# Patient Record
Sex: MALE | Race: WHITE | NOT HISPANIC OR LATINO | Employment: FULL TIME | URBAN - METROPOLITAN AREA
[De-identification: names, ages, dates, MRNs, and addresses within clinical notes are randomized per-mention and may not be internally consistent; named-entity substitution may affect disease eponyms.]

---

## 2024-10-18 ENCOUNTER — APPOINTMENT (OUTPATIENT)
Dept: PRIMARY CARE | Facility: CLINIC | Age: 64
End: 2024-10-18
Payer: COMMERCIAL

## 2024-10-18 VITALS
HEIGHT: 65 IN | OXYGEN SATURATION: 98 % | TEMPERATURE: 97.6 F | WEIGHT: 158.6 LBS | DIASTOLIC BLOOD PRESSURE: 96 MMHG | SYSTOLIC BLOOD PRESSURE: 152 MMHG | HEART RATE: 56 BPM | BODY MASS INDEX: 26.42 KG/M2 | RESPIRATION RATE: 16 BRPM

## 2024-10-18 DIAGNOSIS — G89.29 CHRONIC HAND PAIN, LEFT: ICD-10-CM

## 2024-10-18 DIAGNOSIS — Z23 INFLUENZA VACCINE ADMINISTERED: ICD-10-CM

## 2024-10-18 DIAGNOSIS — E78.5 HYPERLIPIDEMIA, UNSPECIFIED HYPERLIPIDEMIA TYPE: ICD-10-CM

## 2024-10-18 DIAGNOSIS — M79.642 CHRONIC HAND PAIN, LEFT: ICD-10-CM

## 2024-10-18 DIAGNOSIS — I10 HYPERTENSION, UNCONTROLLED: Primary | ICD-10-CM

## 2024-10-18 DIAGNOSIS — Z00.00 HEALTHCARE MAINTENANCE: ICD-10-CM

## 2024-10-18 DIAGNOSIS — Z23 NEED FOR SHINGLES VACCINE: ICD-10-CM

## 2024-10-18 DIAGNOSIS — R94.31 ABNORMAL EKG: ICD-10-CM

## 2024-10-18 DIAGNOSIS — L57.0 ACTINIC KERATOSIS: ICD-10-CM

## 2024-10-18 DIAGNOSIS — Z12.5 SCREENING PSA (PROSTATE SPECIFIC ANTIGEN): ICD-10-CM

## 2024-10-18 DIAGNOSIS — R21 RASH: ICD-10-CM

## 2024-10-18 RX ORDER — LOSARTAN POTASSIUM 50 MG/1
50 TABLET ORAL DAILY
Qty: 60 TABLET | Refills: 0 | Status: SHIPPED | OUTPATIENT
Start: 2024-10-18

## 2024-10-18 RX ORDER — TRIAMCINOLONE ACETONIDE 1 MG/G
CREAM TOPICAL 2 TIMES DAILY
Qty: 30 G | Refills: 0 | Status: SHIPPED | OUTPATIENT
Start: 2024-10-18

## 2024-10-18 ASSESSMENT — ENCOUNTER SYMPTOMS
HEADACHES: 0
SHORTNESS OF BREATH: 0

## 2024-10-18 NOTE — PROGRESS NOTES
"Subjective     Jericho Dotson is a 64 y.o. male who presents for Rash and Trigger Finger (Possible ).    Rash  Pertinent negatives include no shortness of breath.        Pt is here to have left hand/finger pain and decreased ROM evaluated which has been present six months.      He also has hx of elevated blood pressure but has been hesitant to start antihypertensive medications in the past.  He is agreeable to starting medication for HTN.  Patient denies chest pain, shortness of breath, dizziness, headaches or vision changes.       No family hx of CAD or HTN.     Review of Systems   Respiratory:  Negative for shortness of breath.    Cardiovascular:  Negative for chest pain.   Skin:  Positive for rash.   Neurological:  Negative for headaches.       Objective     Vitals:    10/18/24 1005 10/18/24 1136   BP: (!) 168/106 (!) 152/96   BP Location: Left arm    Patient Position: Sitting    Pulse: 56    Resp: 16    Temp: 36.4 °C (97.6 °F)    SpO2: 98%    Weight: 71.9 kg (158 lb 9.6 oz)    Height: 1.651 m (5' 5\")         Current Outpatient Medications   Medication Instructions    losartan (COZAAR) 50 mg, oral, Daily    triamcinolone (Kenalog) 0.1 % cream Topical, 2 times daily        No Known Allergies     Past Surgical History:   Procedure Laterality Date    OTHER SURGICAL HISTORY  09/17/2021    Stomach surgery    OTHER SURGICAL HISTORY  09/17/2021    Tonsillectomy    OTHER SURGICAL HISTORY  06/15/2022    Hernia repair    OTHER SURGICAL HISTORY  06/15/2022    Appendectomy        Social History     Tobacco Use    Smoking status: Never    Smokeless tobacco: Never   Substance Use Topics    Drug use: Yes     Types: Marijuana        No family history on file.     Immunization History   Administered Date(s) Administered    Flu vaccine, trivalent, preservative free, age 6 months and greater (Fluarix/Fluzone/Flulaval) 10/18/2024    Pfizer COVID-19 vaccine, bivalent, age 12 years and older (30 mcg/0.3 mL) 10/22/2022    Pfizer " Purple Cap SARS-CoV-2 05/04/2021, 05/24/2021, 12/11/2021    Tdap vaccine, age 7 year and older (BOOSTRIX, ADACEL) 04/03/2015, 09/17/2021    Zoster vaccine, recombinant, adult (SHINGRIX) 09/17/2021        Physical Exam  Vitals reviewed.   Constitutional:       General: He is not in acute distress.     Appearance: Normal appearance. He is well-developed.   HENT:      Head: Normocephalic and atraumatic.   Eyes:      General: Lids are normal.      Conjunctiva/sclera:      Right eye: Right conjunctiva is not injected.      Left eye: Left conjunctiva is not injected.   Cardiovascular:      Rate and Rhythm: Normal rate and regular rhythm.      Heart sounds: No murmur heard.  Pulmonary:      Effort: Pulmonary effort is normal. No respiratory distress.      Breath sounds: Normal breath sounds. No wheezing, rhonchi or rales.   Skin:     General: Skin is warm and dry.      Findings: Lesion (right cheek of face, possible AK) present.   Neurological:      Mental Status: He is alert and oriented to person, place, and time. Mental status is at baseline.   Psychiatric:         Mood and Affect: Mood normal.         Behavior: Behavior normal.         Assessment & Plan  Hypertension, uncontrolled  Start losartan 50 mg daily, The goals of therapy, medication dose, frequency and potential side effects were discussed with patient today.  The patient is agreeable to taking the medication as prescribed.   Patient was instructed to record blood pressures (using an arm BP monitor) at home 1-2 times per day (per AHA guidelines) and to follow up in office for a blood pressure recheck in 4-6 weeks.  I also encouraged low-sodium diet and regular exercise.  I also discussed with patient the importance of good blood pressure control to avoid long-term complications such as heart attack and stroke.      Orders:    CT cardiac scoring wo IV contrast; Future    Comprehensive Metabolic Panel; Future    CBC and Auto Differential; Future    TSH with  reflex to Free T4 if abnormal; Future    losartan (Cozaar) 50 mg tablet; Take 1 tablet (50 mg) by mouth once daily.    ECG 12 Lead    Referral to Cardiology; Future    Abnormal EKG  Will have pt see cardiology.  Pt is asymptomatic.  I will also order CT calcium score.  Advised to go to ER if he develops any chest pain, shortness of breath, dizziness.    Orders:    Referral to Cardiology; Future    Healthcare maintenance    Orders:    CT cardiac scoring wo IV contrast; Future    Comprehensive Metabolic Panel; Future    Lipid Panel; Future    CBC and Auto Differential; Future    Hemoglobin A1C; Future    Screening PSA (prostate specific antigen)    Orders:    Prostate Specific Antigen; Future    Hyperlipidemia, unspecified hyperlipidemia type  Pt declined statin therapy at this time.   Orders:    CT cardiac scoring wo IV contrast; Future    Comprehensive Metabolic Panel; Future    Lipid Panel; Future    Referral to Cardiology; Future    Chronic hand pain, left    Orders:    Referral to Orthopaedic Surgery; Future    Influenza vaccine administered    Orders:    Flu vaccine, trivalent, preservative free, age 6 months and greater (Fluraix/Fluzone/Flulaval)    Rash    Orders:    triamcinolone (Kenalog) 0.1 % cream; Apply topically 2 times a day.    Actinic keratosis    Orders:    Referral to Dermatology

## 2024-10-21 ENCOUNTER — LAB (OUTPATIENT)
Dept: LAB | Facility: LAB | Age: 64
End: 2024-10-21
Payer: COMMERCIAL

## 2024-10-21 DIAGNOSIS — I10 HYPERTENSION, UNCONTROLLED: ICD-10-CM

## 2024-10-21 DIAGNOSIS — Z00.00 HEALTHCARE MAINTENANCE: ICD-10-CM

## 2024-10-21 DIAGNOSIS — E78.5 HYPERLIPIDEMIA, UNSPECIFIED HYPERLIPIDEMIA TYPE: ICD-10-CM

## 2024-10-21 DIAGNOSIS — Z12.5 SCREENING PSA (PROSTATE SPECIFIC ANTIGEN): ICD-10-CM

## 2024-10-21 LAB
ALBUMIN SERPL BCP-MCNC: 4.3 G/DL (ref 3.4–5)
ALP SERPL-CCNC: 63 U/L (ref 33–136)
ALT SERPL W P-5'-P-CCNC: 26 U/L (ref 10–52)
ANION GAP SERPL CALC-SCNC: 10 MMOL/L (ref 10–20)
AST SERPL W P-5'-P-CCNC: 24 U/L (ref 9–39)
BASOPHILS # BLD AUTO: 0.04 X10*3/UL (ref 0–0.1)
BASOPHILS NFR BLD AUTO: 0.9 %
BILIRUB SERPL-MCNC: 0.7 MG/DL (ref 0–1.2)
BUN SERPL-MCNC: 19 MG/DL (ref 6–23)
CALCIUM SERPL-MCNC: 9.3 MG/DL (ref 8.6–10.6)
CHLORIDE SERPL-SCNC: 104 MMOL/L (ref 98–107)
CHOLEST SERPL-MCNC: 223 MG/DL (ref 0–199)
CHOLESTEROL/HDL RATIO: 3.7
CO2 SERPL-SCNC: 32 MMOL/L (ref 21–32)
CREAT SERPL-MCNC: 1.1 MG/DL (ref 0.5–1.3)
EGFRCR SERPLBLD CKD-EPI 2021: 75 ML/MIN/1.73M*2
EOSINOPHIL # BLD AUTO: 0.13 X10*3/UL (ref 0–0.7)
EOSINOPHIL NFR BLD AUTO: 3 %
ERYTHROCYTE [DISTWIDTH] IN BLOOD BY AUTOMATED COUNT: 11.9 % (ref 11.5–14.5)
EST. AVERAGE GLUCOSE BLD GHB EST-MCNC: 108 MG/DL
GLUCOSE SERPL-MCNC: 95 MG/DL (ref 74–99)
HBA1C MFR BLD: 5.4 %
HCT VFR BLD AUTO: 47 % (ref 41–52)
HDLC SERPL-MCNC: 59.9 MG/DL
HGB BLD-MCNC: 15.7 G/DL (ref 13.5–17.5)
IMM GRANULOCYTES # BLD AUTO: 0.01 X10*3/UL (ref 0–0.7)
IMM GRANULOCYTES NFR BLD AUTO: 0.2 % (ref 0–0.9)
LDLC SERPL CALC-MCNC: 147 MG/DL
LYMPHOCYTES # BLD AUTO: 1.17 X10*3/UL (ref 1.2–4.8)
LYMPHOCYTES NFR BLD AUTO: 27.4 %
MCH RBC QN AUTO: 29.3 PG (ref 26–34)
MCHC RBC AUTO-ENTMCNC: 33.4 G/DL (ref 32–36)
MCV RBC AUTO: 88 FL (ref 80–100)
MONOCYTES # BLD AUTO: 0.65 X10*3/UL (ref 0.1–1)
MONOCYTES NFR BLD AUTO: 15.2 %
NEUTROPHILS # BLD AUTO: 2.27 X10*3/UL (ref 1.2–7.7)
NEUTROPHILS NFR BLD AUTO: 53.3 %
NON HDL CHOLESTEROL: 163 MG/DL (ref 0–149)
NRBC BLD-RTO: 0 /100 WBCS (ref 0–0)
PLATELET # BLD AUTO: 196 X10*3/UL (ref 150–450)
POTASSIUM SERPL-SCNC: 4.3 MMOL/L (ref 3.5–5.3)
PROT SERPL-MCNC: 6.6 G/DL (ref 6.4–8.2)
PSA SERPL-MCNC: 3.17 NG/ML
RBC # BLD AUTO: 5.35 X10*6/UL (ref 4.5–5.9)
SODIUM SERPL-SCNC: 142 MMOL/L (ref 136–145)
TRIGL SERPL-MCNC: 79 MG/DL (ref 0–149)
TSH SERPL-ACNC: 2.52 MIU/L (ref 0.44–3.98)
VLDL: 16 MG/DL (ref 0–40)
WBC # BLD AUTO: 4.3 X10*3/UL (ref 4.4–11.3)

## 2024-10-21 PROCEDURE — 84153 ASSAY OF PSA TOTAL: CPT

## 2024-10-21 PROCEDURE — 36415 COLL VENOUS BLD VENIPUNCTURE: CPT

## 2024-10-21 PROCEDURE — 84443 ASSAY THYROID STIM HORMONE: CPT

## 2024-10-21 PROCEDURE — 80061 LIPID PANEL: CPT

## 2024-10-21 PROCEDURE — 85025 COMPLETE CBC W/AUTO DIFF WBC: CPT

## 2024-10-21 PROCEDURE — 83036 HEMOGLOBIN GLYCOSYLATED A1C: CPT

## 2024-10-21 PROCEDURE — 80053 COMPREHEN METABOLIC PANEL: CPT

## 2024-10-30 ENCOUNTER — OFFICE VISIT (OUTPATIENT)
Dept: ORTHOPEDIC SURGERY | Facility: CLINIC | Age: 64
End: 2024-10-30
Payer: COMMERCIAL

## 2024-10-30 ENCOUNTER — HOSPITAL ENCOUNTER (OUTPATIENT)
Dept: RADIOLOGY | Facility: CLINIC | Age: 64
Discharge: HOME | End: 2024-10-30
Payer: COMMERCIAL

## 2024-10-30 DIAGNOSIS — M79.642 PAIN OF LEFT HAND: ICD-10-CM

## 2024-10-30 DIAGNOSIS — M79.642 CHRONIC HAND PAIN, LEFT: ICD-10-CM

## 2024-10-30 DIAGNOSIS — M19.042 ARTHRITIS OF HAND, LEFT: ICD-10-CM

## 2024-10-30 DIAGNOSIS — G89.29 CHRONIC HAND PAIN, LEFT: ICD-10-CM

## 2024-10-30 PROCEDURE — 99214 OFFICE O/P EST MOD 30 MIN: CPT | Mod: GC | Performed by: ORTHOPAEDIC SURGERY

## 2024-10-30 PROCEDURE — 73130 X-RAY EXAM OF HAND: CPT | Mod: LT

## 2024-10-30 PROCEDURE — 1036F TOBACCO NON-USER: CPT | Performed by: ORTHOPAEDIC SURGERY

## 2024-10-30 PROCEDURE — 99204 OFFICE O/P NEW MOD 45 MIN: CPT | Performed by: ORTHOPAEDIC SURGERY

## 2024-11-11 ENCOUNTER — APPOINTMENT (OUTPATIENT)
Dept: PRIMARY CARE | Facility: CLINIC | Age: 64
End: 2024-11-11
Payer: COMMERCIAL

## 2024-11-11 ENCOUNTER — APPOINTMENT (OUTPATIENT)
Dept: CARDIOLOGY | Facility: CLINIC | Age: 64
End: 2024-11-11
Payer: COMMERCIAL

## 2024-11-11 VITALS
SYSTOLIC BLOOD PRESSURE: 146 MMHG | HEIGHT: 65 IN | BODY MASS INDEX: 26.16 KG/M2 | DIASTOLIC BLOOD PRESSURE: 88 MMHG | WEIGHT: 157 LBS | HEART RATE: 56 BPM

## 2024-11-11 DIAGNOSIS — R94.31 ABNORMAL EKG: ICD-10-CM

## 2024-11-11 DIAGNOSIS — I10 HYPERTENSION, UNCONTROLLED: ICD-10-CM

## 2024-11-11 DIAGNOSIS — E78.5 HYPERLIPIDEMIA, UNSPECIFIED HYPERLIPIDEMIA TYPE: ICD-10-CM

## 2024-11-11 DIAGNOSIS — R07.89 ATYPICAL CHEST PAIN: Primary | ICD-10-CM

## 2024-11-11 PROCEDURE — 3079F DIAST BP 80-89 MM HG: CPT | Performed by: INTERNAL MEDICINE

## 2024-11-11 PROCEDURE — 1036F TOBACCO NON-USER: CPT | Performed by: INTERNAL MEDICINE

## 2024-11-11 PROCEDURE — 3077F SYST BP >= 140 MM HG: CPT | Performed by: INTERNAL MEDICINE

## 2024-11-11 PROCEDURE — 3008F BODY MASS INDEX DOCD: CPT | Performed by: INTERNAL MEDICINE

## 2024-11-11 PROCEDURE — 99204 OFFICE O/P NEW MOD 45 MIN: CPT | Performed by: INTERNAL MEDICINE

## 2024-11-11 PROCEDURE — 93000 ELECTROCARDIOGRAM COMPLETE: CPT | Performed by: INTERNAL MEDICINE

## 2024-11-11 RX ORDER — TADALAFIL 5 MG/1
5 TABLET ORAL DAILY PRN
COMMUNITY

## 2024-11-11 NOTE — ASSESSMENT & PLAN NOTE
Risk factor modification: educational materials were provided to the patient.     At his next appointment, in the context of his upcoming coronary artery calcium score, we will talk about pharmacotherapy for his hyperlipidemia.  Orders:    Referral to Cardiology    Follow Up In Cardiology; Future

## 2024-11-11 NOTE — PATIENT INSTRUCTIONS
"It was my pleasure to meet you.  I look forward to being your cardiologist.  I am a huge believer in communicating with my patients.  Please contact me at any time, if anything is not clear to you regarding anything we have discussed, or if new questions occur to you.     You should increase your intake of fresh fruits and vegetables.  Try to consume 9-12 servings per day of such foods.  You should increase your intake of deep sea fish such as salmon and tuna.  Try to get two servings per week of fish, but if you are a pregnant woman, talk to your obstetrician before increasing your fish intake.  You should increase your intake of unprocessed nuts such as walnuts or almonds.  Increase your intake of plant-based protein.  You should avoid fried foods.  Don't consume sugary or starchy foods and sugary drinks.  Avoid saturated fats.  Try not to dine at restaurants more than once per month, and don't dine at fast food places.  Try to get 7-9 hours of sleep every night.  Try to get 150 minutes per week of moderate intensity exercise (after I have cleared you to start an exercise program).  Try to maintain the appropriate weight for your height based on body mass index (BMI). Maintain your cholesterol, blood sugar, and blood pressure in the recommended respective normal ranges.  There is a wealth of information on the American Heart Association's website regarding this.  Just Google \"Life's Essential 8\" for more information.   Ask me about any of these details  if you have questions.    As your cardiologist, I will be available to you at any time to answer any question you have concerning your heart health.  My staff, Suzie can also answer any questions you may have.  Best of luck.       It is important for us to have an accurate list of the medications, supplements, and their doses.  It is also important for us to have an accurate list of your allergies.  Please bring this information to every appointment.  " This is a vital part of the quality of care you receive through all of your providers.

## 2024-11-11 NOTE — PROGRESS NOTES
"Referred by Dr. Prather for Please see below.     History Of Present Illness:    Jericho Dotson is a 64 y.o. male presenting with hypertension, hyperlipidemia, abnormal EKG.    I am seeing this 64-year-old hypertensive, hyperlipidemic man at the request of Dr. Prather due to hypertension, hyperlipidemia, and an abnormal EKG.    The patient has had hypertension since he was in his thirties.  His PCP prescribed Losartan, but he has not yeat started the medication.  He prides himself on the fact that he is 64 years old and is not taking any medications and for this reason has resisted starting the prescription.  The patient reports from time to time he [id number] post prandial chest discomfort.  He refers to it as \"gas\".  The discomfort is located in his left parasternal region (pointing), and the symptoms seem to occur after meals.  He burps and the chest discomfort resolves.  The symptoms are actually relieved with exertion.  He works as an , and his work involves a great deal of physical exertion.  The patient denies dyspnea, palpitations, orthopnea, PND, syncope, and near syncope.    At his primary care physician's office, recent EKG was done which I have reviewed.  This discloses early repolarization.  His primary care physician ordered a coronary artery calcium score which is scheduled to be done on January 25, 2025.      Past Medical History:  He has no past medical history on file.    Past Surgical History:  He has a past surgical history that includes Other surgical history (09/17/2021); Other surgical history (09/17/2021); Other surgical history (06/15/2022); and Other surgical history (06/15/2022).      Social History:  He reports that he has never smoked. He has never used smokeless tobacco. He reports that he does not currently use alcohol. He reports current drug use. Drug: Marijuana.    Family History:  Family History   Problem Relation Name Age of Onset    Diabetes Father      " "Breast cancer Paternal Grandmother          Allergies:  Patient has no known allergies.    Outpatient Medications:  Current Outpatient Medications   Medication Instructions    losartan (COZAAR) 50 mg, oral, Daily    tadalafil (CIALIS) 5 mg, Daily PRN    triamcinolone (Kenalog) 0.1 % cream Topical, 2 times daily        Last Recorded Vitals:  Vitals:    11/11/24 0700   BP: 146/88   BP Location: Right arm   Patient Position: Sitting   Pulse: 56   Weight: 71.2 kg (157 lb)   Height: 1.651 m (5' 5\")       Physical Exam:  GENERAL:  pleasant 64 year-old  HEENT: No xanthelasma  NECK: Supple, no palpable adenopathy or thyromegaly  CHEST: Clear to auscultation, respiratory effort unlabored  CARDIAC: RRR, normal S1 and S2, no audible murmur, rub, gallop, carotids are brisk, PMI is not displaced  ABD: Active bowel sounds, nontender, no organomegaly, no evidence of ascites  EXT: No clubbing, cyanosis, edema, or tenderness  NEURO: Awake, alert, appropriate, speech is fluent         Last Labs:  CBC -  Lab Results   Component Value Date    WBC 4.3 (L) 10/21/2024    HGB 15.7 10/21/2024    HCT 47.0 10/21/2024    MCV 88 10/21/2024     10/21/2024       CMP -  Lab Results   Component Value Date    CALCIUM 9.3 10/21/2024    PROT 6.6 10/21/2024    ALBUMIN 4.3 10/21/2024    AST 24 10/21/2024    ALT 26 10/21/2024    ALKPHOS 63 10/21/2024    BILITOT 0.7 10/21/2024       LIPID PANEL -   Lab Results   Component Value Date    CHOL 223 (H) 10/21/2024    TRIG 79 10/21/2024    HDL 59.9 10/21/2024    CHHDL 3.7 10/21/2024    LDLF 147 (H) 06/16/2022    VLDL 16 10/21/2024    NHDL 163 (H) 10/21/2024       RENAL FUNCTION PANEL -   Lab Results   Component Value Date    GLUCOSE 95 10/21/2024     10/21/2024    K 4.3 10/21/2024     10/21/2024    CO2 32 10/21/2024    ANIONGAP 10 10/21/2024    BUN 19 10/21/2024    CREATININE 1.10 10/21/2024    GFRMALE 89 06/16/2022    CALCIUM 9.3 10/21/2024    ALBUMIN 4.3 10/21/2024        Lab Results "   Component Value Date    HGBA1C 5.4 10/21/2024         Lab review: I have Chemistry CMP:   Lab Results   Component Value Date    ALBUMIN 4.3 10/21/2024    CALCIUM 9.3 10/21/2024    CO2 32 10/21/2024    CREATININE 1.10 10/21/2024    GLUCOSE 95 10/21/2024    BILITOT 0.7 10/21/2024    PROT 6.6 10/21/2024    ALT 26 10/21/2024    AST 24 10/21/2024    ALKPHOS 63 10/21/2024   , Chemistry BMP   Lab Results   Component Value Date    GLUCOSE 95 10/21/2024    CALCIUM 9.3 10/21/2024    CO2 32 10/21/2024    CREATININE 1.10 10/21/2024   , CBC:  Lab Results   Component Value Date    WBC 4.3 (L) 10/21/2024    RBC 5.35 10/21/2024    HGB 15.7 10/21/2024    HCT 47.0 10/21/2024    MCV 88 10/21/2024    MCH 29.3 10/21/2024    MCHC 33.4 10/21/2024    RDW 11.9 10/21/2024    NRBC 0.0 10/21/2024   , and Lipids:   Lab Results   Component Value Date    CHOL 223 (H) 10/21/2024    HDL 59.9 10/21/2024    LDLCALC 147 (H) 10/21/2024    TRIG 79 10/21/2024     Diagnostic review: I have independently interpreted the EKG .  My findings are the EKG done on October 18, 2024 independently interpreted by me discloses sinus rhythm with early repolarization.    Assessment/Plan   Assessment & Plan  Hypertension, uncontrolled  HTN: BP is not well controlled.   We discussed sodium restriction, lifestyle modification, and the DASH diet.  I advised the patient to log blood pressures daily, and to bring the data to the next appointment.  If home BPs are also high, will need to add, or adjust medications.    I advised him to start taking the losartan as prescribed by his primary care physician.  I explained to him that untreated hypertension can lead to myocardial infarction, stroke, heart failure, and renal failure.  He understands and agrees.    Risk factor modification: educational materials were provided to the patient.     Orders:    Referral to Cardiology    Follow Up In Cardiology; Future    Hyperlipidemia, unspecified hyperlipidemia type  Risk factor  modification: educational materials were provided to the patient.     At his next appointment, in the context of his upcoming coronary artery calcium score, we will talk about pharmacotherapy for his hyperlipidemia.  Orders:    Referral to Cardiology    Follow Up In Cardiology; Future    Abnormal EKG    Orders:    Referral to Cardiology    ECG 12 lead (Clinic Performed)    Follow Up In Cardiology; Future    Atypical chest pain    Orders:    Stress Test; Future    Follow Up In Cardiology; Future          Ubaldo Bedoya MD

## 2024-11-11 NOTE — ASSESSMENT & PLAN NOTE
HTN: BP is not well controlled.   We discussed sodium restriction, lifestyle modification, and the DASH diet.  I advised the patient to log blood pressures daily, and to bring the data to the next appointment.  If home BPs are also high, will need to add, or adjust medications.    I advised him to start taking the losartan as prescribed by his primary care physician.  I explained to him that untreated hypertension can lead to myocardial infarction, stroke, heart failure, and renal failure.  He understands and agrees.    Risk factor modification: educational materials were provided to the patient.     Orders:    Referral to Cardiology    Follow Up In Cardiology; Future

## 2024-11-13 PROBLEM — M19.042 ARTHRITIS OF HAND, LEFT: Status: ACTIVE | Noted: 2024-11-13

## 2025-01-05 DIAGNOSIS — I10 HYPERTENSION, UNCONTROLLED: ICD-10-CM

## 2025-01-05 RX ORDER — LOSARTAN POTASSIUM 50 MG/1
50 TABLET ORAL DAILY
Qty: 30 TABLET | Refills: 0 | Status: SHIPPED | OUTPATIENT
Start: 2025-01-05

## 2025-01-06 ENCOUNTER — APPOINTMENT (OUTPATIENT)
Dept: UROLOGY | Facility: CLINIC | Age: 65
End: 2025-01-06
Payer: COMMERCIAL

## 2025-01-06 VITALS
BODY MASS INDEX: 28.12 KG/M2 | HEART RATE: 61 BPM | SYSTOLIC BLOOD PRESSURE: 162 MMHG | TEMPERATURE: 98.2 F | WEIGHT: 169 LBS | DIASTOLIC BLOOD PRESSURE: 92 MMHG

## 2025-01-06 DIAGNOSIS — N40.1 BPH WITH LOWER URINARY TRACT SYMPTOMS WITHOUT URINARY OBSTRUCTION: ICD-10-CM

## 2025-01-06 DIAGNOSIS — N52.9 VASCULOGENIC ERECTILE DYSFUNCTION, UNSPECIFIED VASCULOGENIC ERECTILE DYSFUNCTION TYPE: Primary | ICD-10-CM

## 2025-01-06 PROCEDURE — 99214 OFFICE O/P EST MOD 30 MIN: CPT | Performed by: UROLOGY

## 2025-01-06 PROCEDURE — 3077F SYST BP >= 140 MM HG: CPT | Performed by: UROLOGY

## 2025-01-06 PROCEDURE — 1036F TOBACCO NON-USER: CPT | Performed by: UROLOGY

## 2025-01-06 PROCEDURE — G2211 COMPLEX E/M VISIT ADD ON: HCPCS | Performed by: UROLOGY

## 2025-01-06 PROCEDURE — 3080F DIAST BP >= 90 MM HG: CPT | Performed by: UROLOGY

## 2025-01-06 RX ORDER — TADALAFIL 5 MG/1
5 TABLET ORAL DAILY
Qty: 90 TABLET | Refills: 3 | Status: SHIPPED | OUTPATIENT
Start: 2025-01-06 | End: 2026-01-06

## 2025-01-06 NOTE — PROGRESS NOTES
PRIOR NOTES  64-year-old male here to see me regarding urinary symptoms, erectile dysfunction  Kindly referred by Dr. 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  -Prostate 4.2 x 3.3 x 4.0  -Prostate volume approximately 30 g  IPSS 5 QOL 1  ED is mild, responds well to tadalafil, does not always need it  Exam: Modest enlargement, benign gland     LUTS  - 08/2022 - observation  - 01/2025 - IPSS 17    PSA  - 2022 - 2.64, PSAD 0.08  - 10/2024 - 3.17     ED  - tadalafil  5mg  - 1/2025 - switched to a med he gets from an [employer] market, unsure of what it is, works well with this drug    UPDATED SUBJECTIVE HISTORY  01/06/25 -     LUTS - weak flow, frequency, incomplete emptying, double voiding (in am)  Denies: hesitancy, urgency, straining, intermittency, incontinence, hematuria  NTF: 1x  QOL: 2-3    Past Medical History  He has no past medical history on file.    Surgical History  He has a past surgical history that includes Other surgical history (09/17/2021); Other surgical history (09/17/2021); Other surgical history (06/15/2022); and Other surgical history (06/15/2022).     Social History  He reports that he has never smoked. He has never used smokeless tobacco. He reports that he does not currently use alcohol. He reports current drug use. Drug: Marijuana.    Family History  Family History   Problem Relation Name Age of Onset    Diabetes Father      Breast cancer Paternal Grandmother          Allergies  Patient has no known allergies.    ROS: 12 system review was completed and is negative with the exception of those signs and symptoms noted in the history of present illness: A 12  system review was completed and is negative with the exception of those signs and symptoms noted in the history of present illness.     Exam:  General: in NAD, appears stated age  Head: normocephalic, atraumatic  Respiratory: normal effort, no use of accessory muscles  Cardiovascular: no edema noted  Skin: normal turgor, no rashes  Neurologic: grossly intact, oriented to person/place/time  Psychiatric: mode and affect appropriate     Last Recorded Vitals  Blood pressure (!) 162/92, pulse 61, temperature 36.8 °C (98.2 °F), temperature source Temporal, weight 76.7 kg (169 lb).    Lab Results   Component Value Date    CREATININE 1.10 10/21/2024    HGB 15.7 10/21/2024         ASSESSMENT/PLAN:  # Lower urinary tract symptoms  -Patient has a weak stream, mild urgency  -IPSS 17  -We discussed tamsulosin, tadalafil, and surgical management  -Patient is averse to pills but is willing to try tadalafil 5 mg daily to treat this and his erectile dysfunction  -Reassess in 6 months    # Erectile dysfunction  -I recommend he stop his over-the-counter that he is unaware of the ingredients for  -Recommend he restart tadalafil 5 mg daily    Follow-up 6 months    Abhilash Tellez MD

## 2025-01-07 NOTE — TELEPHONE ENCOUNTER
Per pt, our office is too far for him now, he is considering switching doctors. He will call back if he decides to stay with us.

## 2025-01-25 ENCOUNTER — HOSPITAL ENCOUNTER (OUTPATIENT)
Dept: RADIOLOGY | Facility: HOSPITAL | Age: 65
Discharge: HOME | End: 2025-01-25
Payer: COMMERCIAL

## 2025-01-25 DIAGNOSIS — E78.5 HYPERLIPIDEMIA, UNSPECIFIED HYPERLIPIDEMIA TYPE: ICD-10-CM

## 2025-01-25 DIAGNOSIS — Z00.00 HEALTHCARE MAINTENANCE: ICD-10-CM

## 2025-01-25 DIAGNOSIS — I10 HYPERTENSION, UNCONTROLLED: ICD-10-CM

## 2025-01-25 PROCEDURE — 75571 CT HRT W/O DYE W/CA TEST: CPT

## 2025-01-27 ENCOUNTER — TELEPHONE (OUTPATIENT)
Facility: CLINIC | Age: 65
End: 2025-01-27
Payer: COMMERCIAL

## 2025-01-30 DIAGNOSIS — I10 HYPERTENSION, UNCONTROLLED: ICD-10-CM

## 2025-01-30 RX ORDER — LOSARTAN POTASSIUM 50 MG/1
50 TABLET ORAL DAILY
Qty: 90 TABLET | Refills: 0 | Status: SHIPPED | OUTPATIENT
Start: 2025-01-30

## 2025-02-17 ENCOUNTER — APPOINTMENT (OUTPATIENT)
Dept: CARDIOLOGY | Facility: CLINIC | Age: 65
End: 2025-02-17
Payer: COMMERCIAL

## 2025-02-24 ENCOUNTER — APPOINTMENT (OUTPATIENT)
Dept: CARDIOLOGY | Facility: CLINIC | Age: 65
End: 2025-02-24
Payer: COMMERCIAL

## 2025-02-24 VITALS
DIASTOLIC BLOOD PRESSURE: 80 MMHG | HEIGHT: 65 IN | SYSTOLIC BLOOD PRESSURE: 140 MMHG | HEART RATE: 60 BPM | BODY MASS INDEX: 26.82 KG/M2 | WEIGHT: 161 LBS

## 2025-02-24 DIAGNOSIS — I77.810 ECTATIC THORACIC AORTA (CMS-HCC): Primary | ICD-10-CM

## 2025-02-24 DIAGNOSIS — R94.31 ABNORMAL EKG: ICD-10-CM

## 2025-02-24 DIAGNOSIS — E78.5 HYPERLIPIDEMIA, UNSPECIFIED HYPERLIPIDEMIA TYPE: ICD-10-CM

## 2025-02-24 DIAGNOSIS — I10 HYPERTENSION, UNCONTROLLED: ICD-10-CM

## 2025-02-24 DIAGNOSIS — R07.89 ATYPICAL CHEST PAIN: ICD-10-CM

## 2025-02-24 PROCEDURE — 3079F DIAST BP 80-89 MM HG: CPT | Performed by: INTERNAL MEDICINE

## 2025-02-24 PROCEDURE — 3008F BODY MASS INDEX DOCD: CPT | Performed by: INTERNAL MEDICINE

## 2025-02-24 PROCEDURE — 3077F SYST BP >= 140 MM HG: CPT | Performed by: INTERNAL MEDICINE

## 2025-02-24 PROCEDURE — 99214 OFFICE O/P EST MOD 30 MIN: CPT | Performed by: INTERNAL MEDICINE

## 2025-02-24 PROCEDURE — 1036F TOBACCO NON-USER: CPT | Performed by: INTERNAL MEDICINE

## 2025-02-24 RX ORDER — LOSARTAN POTASSIUM 100 MG/1
100 TABLET ORAL ONCE
Status: DISCONTINUED | OUTPATIENT
Start: 2025-02-24 | End: 2025-02-28

## 2025-02-24 NOTE — PROGRESS NOTES
"Chief Complaint:   Please see below.     History Of Present Illness:    Jericho Dotson is a 64 y.o. male presenting with hypertension, hyperlipidemia, abnormal EKG .    At the previous visit, he describes episodic chest discomfort.  Please see my prior note for details.  I ordered a stress test, and his PCP ordered a coronary artery calcium score.  He did not follow through with the stress test.   The patient's coronary artery calcium score on 1/25/2025 was 0.  His ascending aorta measured 4.0 cm in diameter.  The patient's projected 10 year event rate according to MISHRA with calcium score is 3%.     He continues to experience episodic chest discomfort that feels like gas, twice a week.  He burps, and the symptoms go away.  The chest discomfort is not exertional.  The symptoms are similar to his description in my previous note.  The patient denies dyspnea, palpitations, orthopnea, PND, syncope, and near syncope.         Last Recorded Vitals:  Vitals:    02/24/25 0900   BP: 140/80   BP Location: Left arm   Patient Position: Sitting   Pulse: 60   Weight: 73 kg (161 lb)   Height: 1.651 m (5' 5\")       Past Medical History:  He has no past medical history on file.    Past Surgical History:  He has a past surgical history that includes Other surgical history (09/17/2021); Other surgical history (09/17/2021); Other surgical history (06/15/2022); and Other surgical history (06/15/2022).      Social History:  He reports that he has never smoked. He has never used smokeless tobacco. He reports that he does not currently use alcohol. He reports that he does not currently use drugs after having used the following drugs: Marijuana.    Family History:  Family History   Problem Relation Name Age of Onset    Diabetes Father      Breast cancer Paternal Grandmother          Allergies:  Patient has no known allergies.    Outpatient Medications:  Current Outpatient Medications   Medication Instructions    tadalafil (CIALIS) 5 mg, " oral, Daily    triamcinolone (Kenalog) 0.1 % cream Topical, 2 times daily       Physical Exam:  GENERAL:  pleasant 64 year-old  HEENT: No xanthelasma  NECK: Supple, no palpable adenopathy or thyromegaly  CHEST: Clear to auscultation, respiratory effort unlabored  CARDIAC: RRR, normal S1 and S2, no audible murmur, rub, gallop, carotids are brisk, PMI is not displaced  ABD: Active bowel sounds, nontender, no organomegaly, no evidence of ascites  EXT: No clubbing, cyanosis, edema, or tenderness  NEURO: Awake, alert, appropriate, speech is fluent       Last Labs:  CBC -  Lab Results   Component Value Date    WBC 4.3 (L) 10/21/2024    HGB 15.7 10/21/2024    HCT 47.0 10/21/2024    MCV 88 10/21/2024     10/21/2024       CMP -  Lab Results   Component Value Date    CALCIUM 9.3 10/21/2024    PROT 6.6 10/21/2024    ALBUMIN 4.3 10/21/2024    AST 24 10/21/2024    ALT 26 10/21/2024    ALKPHOS 63 10/21/2024    BILITOT 0.7 10/21/2024       LIPID PANEL -   Lab Results   Component Value Date    CHOL 223 (H) 10/21/2024    TRIG 79 10/21/2024    HDL 59.9 10/21/2024    CHHDL 3.7 10/21/2024    LDLF 147 (H) 06/16/2022    VLDL 16 10/21/2024    NHDL 163 (H) 10/21/2024       RENAL FUNCTION PANEL -   Lab Results   Component Value Date    GLUCOSE 95 10/21/2024     10/21/2024    K 4.3 10/21/2024     10/21/2024    CO2 32 10/21/2024    ANIONGAP 10 10/21/2024    BUN 19 10/21/2024    CREATININE 1.10 10/21/2024    GFRMALE 89 06/16/2022    CALCIUM 9.3 10/21/2024    ALBUMIN 4.3 10/21/2024        Lab Results   Component Value Date    HGBA1C 5.4 10/21/2024         Lab review: I have Chemistry CMP:   Lab Results   Component Value Date    ALBUMIN 4.3 10/21/2024    CALCIUM 9.3 10/21/2024    CO2 32 10/21/2024    CREATININE 1.10 10/21/2024    GLUCOSE 95 10/21/2024    BILITOT 0.7 10/21/2024    PROT 6.6 10/21/2024    ALT 26 10/21/2024    AST 24 10/21/2024    ALKPHOS 63 10/21/2024   , Chemistry BMP   Lab Results   Component Value Date     GLUCOSE 95 10/21/2024    CALCIUM 9.3 10/21/2024    CO2 32 10/21/2024    CREATININE 1.10 10/21/2024   , CBC:  Lab Results   Component Value Date    WBC 4.3 (L) 10/21/2024    RBC 5.35 10/21/2024    HGB 15.7 10/21/2024    HCT 47.0 10/21/2024    MCV 88 10/21/2024    MCH 29.3 10/21/2024    MCHC 33.4 10/21/2024    RDW 11.9 10/21/2024    NRBC 0.0 10/21/2024   , and Lipids:   Lab Results   Component Value Date    CHOL 223 (H) 10/21/2024    HDL 59.9 10/21/2024    LDLCALC 147 (H) 10/21/2024    TRIG 79 10/21/2024     Diagnostic review: I have personally reviewed the result(s) of the coronary artery calcium score. The patient's projected 10 year event rate according to MISHRA with calcium score is 3%.      Assessment/Plan   Assessment & Plan  Hypertension, uncontrolled  Risk factor modification: educational materials were provided to the patient.     Orders:    Follow Up In Cardiology    losartan (Cozaar) tablet 100 mg    Follow Up In Cardiology; Future    Hyperlipidemia, unspecified hyperlipidemia type  Risk factor modification: educational materials were provided to the patient.     Orders:    Follow Up In Cardiology    Follow Up In Cardiology; Future    Abnormal EKG    Orders:    Follow Up In Cardiology    Atypical chest pain  Treadmill stress test as ordered at the previous visit, with follow-up thereafter.  Orders:    Follow Up In Cardiology    Follow Up In Cardiology; Future    Ectatic thoracic aorta (CMS-HCC)               Ubaldo Bedoya MD

## 2025-02-24 NOTE — ASSESSMENT & PLAN NOTE
Risk factor modification: educational materials were provided to the patient.     Orders:    Follow Up In Cardiology    losartan (Cozaar) tablet 100 mg    Follow Up In Cardiology; Future

## 2025-02-24 NOTE — ASSESSMENT & PLAN NOTE
Treadmill stress test as ordered at the previous visit, with follow-up thereafter.  Orders:    Follow Up In Cardiology    Follow Up In Cardiology; Future

## 2025-02-24 NOTE — PATIENT INSTRUCTIONS

## 2025-02-28 ENCOUNTER — APPOINTMENT (OUTPATIENT)
Facility: CLINIC | Age: 65
End: 2025-02-28
Payer: COMMERCIAL

## 2025-02-28 VITALS
DIASTOLIC BLOOD PRESSURE: 86 MMHG | WEIGHT: 157 LBS | BODY MASS INDEX: 26.13 KG/M2 | HEART RATE: 52 BPM | TEMPERATURE: 98.4 F | SYSTOLIC BLOOD PRESSURE: 156 MMHG | RESPIRATION RATE: 18 BRPM | OXYGEN SATURATION: 98 %

## 2025-02-28 DIAGNOSIS — I10 HYPERTENSION, UNCONTROLLED: Primary | ICD-10-CM

## 2025-02-28 DIAGNOSIS — I77.810 ECTATIC THORACIC AORTA (CMS-HCC): ICD-10-CM

## 2025-02-28 DIAGNOSIS — E78.5 HYPERLIPIDEMIA, UNSPECIFIED HYPERLIPIDEMIA TYPE: ICD-10-CM

## 2025-02-28 DIAGNOSIS — R73.9 HYPERGLYCEMIA: ICD-10-CM

## 2025-02-28 PROCEDURE — 3079F DIAST BP 80-89 MM HG: CPT | Performed by: FAMILY MEDICINE

## 2025-02-28 PROCEDURE — 1036F TOBACCO NON-USER: CPT | Performed by: FAMILY MEDICINE

## 2025-02-28 PROCEDURE — 3077F SYST BP >= 140 MM HG: CPT | Performed by: FAMILY MEDICINE

## 2025-02-28 PROCEDURE — 99214 OFFICE O/P EST MOD 30 MIN: CPT | Performed by: FAMILY MEDICINE

## 2025-02-28 RX ORDER — LOSARTAN POTASSIUM 100 MG/1
100 TABLET ORAL DAILY
COMMUNITY
Start: 2025-02-28

## 2025-02-28 ASSESSMENT — PATIENT HEALTH QUESTIONNAIRE - PHQ9
SUM OF ALL RESPONSES TO PHQ9 QUESTIONS 1 AND 2: 0
2. FEELING DOWN, DEPRESSED OR HOPELESS: NOT AT ALL
1. LITTLE INTEREST OR PLEASURE IN DOING THINGS: NOT AT ALL

## 2025-02-28 ASSESSMENT — ENCOUNTER SYMPTOMS
HYPERTENSION: 1
SHORTNESS OF BREATH: 0
HEADACHES: 0

## 2025-02-28 NOTE — PROGRESS NOTES
"Subjective     Jericho Dotson is a 64 y.o. male who presents for Hypertension.    Hypertension  Associated symptoms include anxiety. Pertinent negatives include no chest pain, headaches or shortness of breath.      Pt was seen by  cardiology, 2/24, for HTN, HLD, EKG abnormal .  He was supposed to get a stress test due to chest pain but has not completed due to increased work schedule.  Pt's losartan was increased to 100 mg by his cardiologist.  Pt is not checking his blood pressures.      Pt had a recent CT calcium score in Jan 2025, score was 0 but it was noted he had mid/lower ascending thoracic aorta measures 4.0 cm in diameter.      He has HLD, not on statin.      Pt denies any current chest pain.   He does sometimes gets \"gas\" that causes chest discomfort.  No shortness of breath.      He is eating better.      Review of Systems   Respiratory:  Negative for shortness of breath.    Cardiovascular:  Negative for chest pain.   Neurological:  Negative for headaches.       Objective     Vitals:    02/28/25 0859   BP: 156/86   BP Location: Left arm   Patient Position: Sitting   Pulse: 52   Resp: 18   Temp: 36.9 °C (98.4 °F)   TempSrc: Temporal   SpO2: 98%   Weight: 71.2 kg (157 lb)        Current Outpatient Medications   Medication Instructions    losartan (COZAAR) 100 mg, oral, Daily    tadalafil (CIALIS) 5 mg, oral, Daily    triamcinolone (Kenalog) 0.1 % cream Topical, 2 times daily        No Known Allergies     Past Surgical History:   Procedure Laterality Date    OTHER SURGICAL HISTORY  09/17/2021    Stomach surgery    OTHER SURGICAL HISTORY  09/17/2021    Tonsillectomy    OTHER SURGICAL HISTORY  06/15/2022    Hernia repair    OTHER SURGICAL HISTORY  06/15/2022    Appendectomy        Social History     Tobacco Use    Smoking status: Never    Smokeless tobacco: Never   Vaping Use    Vaping status: Never Used   Substance Use Topics    Alcohol use: Not Currently     Comment: stopped 2023.    Drug use: Not " Currently     Types: Marijuana     Comment: Hx:  THC Gummies. (Quit 2025).        Family History   Problem Relation Name Age of Onset    Diabetes Father      Breast cancer Paternal Grandmother          Immunization History   Administered Date(s) Administered    COVID-19, mRNA, LNP-S, PF, 30 mcg/0.3 mL dose 05/04/2021, 05/24/2021, 12/11/2021    Flu vaccine, trivalent, preservative free, age 6 months and greater (Fluarix/Fluzone/Flulaval) 10/18/2024    Pfizer COVID-19 vaccine, bivalent, age 12 years and older (30 mcg/0.3 mL) 10/22/2022    Tdap vaccine, age 7 year and older (BOOSTRIX, ADACEL) 04/03/2015, 09/17/2021    Zoster vaccine, recombinant, adult (SHINGRIX) 09/17/2021        Physical Exam  Vitals reviewed.   Constitutional:       General: He is not in acute distress.     Appearance: Normal appearance. He is well-developed.   HENT:      Head: Normocephalic and atraumatic.   Eyes:      General: Lids are normal.      Conjunctiva/sclera:      Right eye: Right conjunctiva is not injected.      Left eye: Left conjunctiva is not injected.   Cardiovascular:      Rate and Rhythm: Normal rate and regular rhythm.      Heart sounds: No murmur heard.  Pulmonary:      Effort: Pulmonary effort is normal. No respiratory distress.      Breath sounds: Normal breath sounds. No wheezing, rhonchi or rales.   Skin:     General: Skin is warm and dry.      Findings: No rash.   Neurological:      Mental Status: He is alert and oriented to person, place, and time. Mental status is at baseline.   Psychiatric:         Mood and Affect: Mood normal.         Behavior: Behavior normal.         Assessment & Plan  Hypertension, uncontrolled  On losartan 100 mg daily, pt declined further antihypertensive medication at this time.  He will get stress testing ordered by cardiology.  Patient was instructed to record blood pressures (using an arm BP monitor) at home 1-2 times per day (per AHA guidelines) and to follow up in office for a blood pressure  recheck in 4-6 weeks.  I also encouraged low-sodium diet and regular exercise.  I also discussed with patient the importance of good blood pressure control to avoid long-term complications such as heart attack and stroke.      Complete lipids, CMP, ha1c    Orders:    Blood pressure monitor    Follow Up In Primary Care - Established; Future    Hyperlipidemia, unspecified hyperlipidemia type  Discussed ASCVD risk of 17%, HLD - pt declined statin therapy. He will get stress testing.   Calcium score of 0.    Orders:    Comprehensive Metabolic Panel; Future    Lipid Panel; Future    Follow Up In Primary Care - Established; Future    Ectatic thoracic aorta (CMS-HCC)  Sees cardiology       Hyperglycemia    Orders:    Hemoglobin A1C; Future

## 2025-02-28 NOTE — ASSESSMENT & PLAN NOTE
Discussed ASCVD risk of 17%, HLD - pt declined statin therapy. He will get stress testing.   Calcium score of 0.    Orders:    Comprehensive Metabolic Panel; Future    Lipid Panel; Future    Follow Up In Primary Care - Established; Future    
On losartan 100 mg daily, pt declined further antihypertensive medication at this time.  He will get stress testing ordered by cardiology.  Patient was instructed to record blood pressures (using an arm BP monitor) at home 1-2 times per day (per AHA guidelines) and to follow up in office for a blood pressure recheck in 4-6 weeks.  I also encouraged low-sodium diet and regular exercise.  I also discussed with patient the importance of good blood pressure control to avoid long-term complications such as heart attack and stroke.      Complete lipids, CMP, ha1c    Orders:    Blood pressure monitor    Follow Up In Primary Care - Established; Future    
Sees cardiology       
Detail Level: Detailed
Quality 137: Melanoma: Continuity Of Care - Recall System: Patient information entered into a recall system that includes: target date for the next exam specified AND a process to follow up with patients regarding missed or unscheduled appointments

## 2025-03-01 LAB
ALBUMIN SERPL-MCNC: 4.5 G/DL (ref 3.6–5.1)
ALP SERPL-CCNC: 58 U/L (ref 35–144)
ALT SERPL-CCNC: 26 U/L (ref 9–46)
ANION GAP SERPL CALCULATED.4IONS-SCNC: 9 MMOL/L (CALC) (ref 7–17)
AST SERPL-CCNC: 23 U/L (ref 10–35)
BILIRUB SERPL-MCNC: 0.6 MG/DL (ref 0.2–1.2)
BUN SERPL-MCNC: 16 MG/DL (ref 7–25)
CALCIUM SERPL-MCNC: 9.3 MG/DL (ref 8.6–10.3)
CHLORIDE SERPL-SCNC: 105 MMOL/L (ref 98–110)
CHOLEST SERPL-MCNC: 225 MG/DL
CHOLEST/HDLC SERPL: 3.4 (CALC)
CO2 SERPL-SCNC: 28 MMOL/L (ref 20–32)
CREAT SERPL-MCNC: 1.01 MG/DL (ref 0.7–1.35)
EGFRCR SERPLBLD CKD-EPI 2021: 84 ML/MIN/1.73M2
EST. AVERAGE GLUCOSE BLD GHB EST-MCNC: 117 MG/DL
EST. AVERAGE GLUCOSE BLD GHB EST-SCNC: 6.5 MMOL/L
GLUCOSE SERPL-MCNC: 93 MG/DL (ref 65–99)
HBA1C MFR BLD: 5.7 % OF TOTAL HGB
HDLC SERPL-MCNC: 66 MG/DL
LDLC SERPL CALC-MCNC: 143 MG/DL (CALC)
NONHDLC SERPL-MCNC: 159 MG/DL (CALC)
POTASSIUM SERPL-SCNC: 4.5 MMOL/L (ref 3.5–5.3)
PROT SERPL-MCNC: 6.7 G/DL (ref 6.1–8.1)
SODIUM SERPL-SCNC: 142 MMOL/L (ref 135–146)
TRIGL SERPL-MCNC: 65 MG/DL

## 2025-03-07 ENCOUNTER — HOSPITAL ENCOUNTER (OUTPATIENT)
Dept: CARDIOLOGY | Facility: HOSPITAL | Age: 65
Discharge: HOME | End: 2025-03-07
Payer: COMMERCIAL

## 2025-03-07 DIAGNOSIS — R07.89 ATYPICAL CHEST PAIN: ICD-10-CM

## 2025-03-07 DIAGNOSIS — R07.9 CHEST PAIN, UNSPECIFIED: ICD-10-CM

## 2025-03-07 PROCEDURE — 93017 CV STRESS TEST TRACING ONLY: CPT

## 2025-03-07 PROCEDURE — 93018 CV STRESS TEST I&R ONLY: CPT | Performed by: INTERNAL MEDICINE

## 2025-03-07 PROCEDURE — 93016 CV STRESS TEST SUPVJ ONLY: CPT | Performed by: INTERNAL MEDICINE

## 2025-03-14 ENCOUNTER — APPOINTMENT (OUTPATIENT)
Dept: CARDIOLOGY | Facility: HOSPITAL | Age: 65
End: 2025-03-14
Payer: COMMERCIAL

## 2025-03-26 DIAGNOSIS — I10 HYPERTENSION, UNCONTROLLED: ICD-10-CM

## 2025-03-27 RX ORDER — LOSARTAN POTASSIUM 100 MG/1
100 TABLET ORAL DAILY
Qty: 90 TABLET | Refills: 3 | Status: SHIPPED | OUTPATIENT
Start: 2025-03-27

## 2025-04-01 ENCOUNTER — TELEPHONE (OUTPATIENT)
Facility: CLINIC | Age: 65
End: 2025-04-01
Payer: COMMERCIAL

## 2025-04-01 NOTE — TELEPHONE ENCOUNTER
Per wife, Jericho was prescribed a blood pressure monitor on 2/28/25 but she doesn't know where it was sent. Please advise.

## 2025-04-02 DIAGNOSIS — I10 HYPERTENSION, UNCONTROLLED: Primary | ICD-10-CM

## 2025-04-02 RX ORDER — ACETAMINOPHEN 500 MG
1 TABLET ORAL DAILY PRN
Qty: 1 KIT | Refills: 0 | Status: SHIPPED | OUTPATIENT
Start: 2025-04-02

## 2025-04-07 ENCOUNTER — APPOINTMENT (OUTPATIENT)
Dept: DERMATOLOGY | Facility: CLINIC | Age: 65
End: 2025-04-07
Payer: COMMERCIAL

## 2025-04-07 DIAGNOSIS — L82.1 SEBORRHEIC KERATOSIS: ICD-10-CM

## 2025-04-07 DIAGNOSIS — L57.9 SKIN CHANGES DUE TO CHRONIC EXPOSURE TO NONIONIZING RADIATION: Primary | ICD-10-CM

## 2025-04-07 DIAGNOSIS — D18.01 HEMANGIOMA OF SKIN: ICD-10-CM

## 2025-04-07 DIAGNOSIS — L81.4 LENTIGO: ICD-10-CM

## 2025-04-07 DIAGNOSIS — L57.0 ACTINIC KERATOSES: ICD-10-CM

## 2025-04-07 PROCEDURE — 99203 OFFICE O/P NEW LOW 30 MIN: CPT | Performed by: STUDENT IN AN ORGANIZED HEALTH CARE EDUCATION/TRAINING PROGRAM

## 2025-04-07 NOTE — PROGRESS NOTES
Subjective     Kemar Dotson is a 64 y.o. male who presents for the following: Rash (To right temple/preauricular area. Present for about 1 year. PCP Rx'd triamcinolone 0.1% cream, uses on occasion. ).     Review of Systems:  No other skin or systemic complaints other than what is documented elsewhere in the note.    The following portions of the chart were reviewed this encounter and updated as appropriate:          Skin Cancer History  No skin cancer on file.      Specialty Problems    None       Objective   Well appearing patient in no apparent distress; mood and affect are within normal limits.    A focused skin examination was performed. All findings within normal limits unless otherwise noted below.    Assessment/Plan   1. Skin changes due to chronic exposure to nonionizing radiation  Mottled pigmentation, telangiectasias and brown reticular macules in sun exposed areas on the face, extremities, trunk    The risk of chronic, cumulative sun damage and risk of development of skin cancer was reviewed with the patient today. Discussed important of sun protection with sun protective clothing and/or broad spectrum sunscreen spf 30 or above.  Warning signs of skin cancer were reviewed. Patient to contact office should they notice any new or changing pre-existing skin lesion.    Related Procedures  Follow Up In Dermatology - Established Patient    2. Actinic keratoses (5)  Left Forehead, Left Preauricular Area, Right Malar Cheek, Right Desdemona, Right Zygomatic Area  Erythematous gritty macule(s)    Lesions are due to chronic, cumulative sun damage over time and are pre-cancerous. They have a risk of developing into squamous cell carcinoma and therefore treatment recommendations were offered and discussed with the patient. Discussed LN2 & topical chemotherapy creams, risks and benefits of each. The risks and benefits of LN2 were reviewed including incomplete removal, crusting, blister hypo and/or hyperpigmentation,  scarring.     Declined treatment until next Friday    3. Seborrheic keratosis  Stuck on verrucous, tan-brown papules and plaques.      The benign nature of these skin lesions were reviewed with the patient today and reassurance was provided. Patient advised to return for f/u if the lesions change in size, shape, color, become painful, tender, itch or bleed.    4. Hemangioma of skin  Bright red papules    Discussed benign nature of condition, reassured. Reviewed warning signs of skin cancer with patient.    5. Lentigo  Scattered tan macules in sun-exposed areas.    Benign nature of these skin lesions reviewed and relation to sun exposure discussed. Reassurance provided. Reviewed warning signs of skin cancer.

## 2025-04-15 ENCOUNTER — APPOINTMENT (OUTPATIENT)
Facility: CLINIC | Age: 65
End: 2025-04-15
Payer: COMMERCIAL

## 2025-04-15 VITALS
BODY MASS INDEX: 26.96 KG/M2 | DIASTOLIC BLOOD PRESSURE: 88 MMHG | TEMPERATURE: 97.6 F | RESPIRATION RATE: 18 BRPM | SYSTOLIC BLOOD PRESSURE: 138 MMHG | HEART RATE: 60 BPM | WEIGHT: 162 LBS | OXYGEN SATURATION: 95 %

## 2025-04-15 DIAGNOSIS — I77.810 ECTATIC THORACIC AORTA (CMS-HCC): ICD-10-CM

## 2025-04-15 DIAGNOSIS — I10 HYPERTENSION, UNCONTROLLED: Primary | ICD-10-CM

## 2025-04-15 DIAGNOSIS — R07.89 CHEST PAIN, ATYPICAL: ICD-10-CM

## 2025-04-15 DIAGNOSIS — Z91.89 AT INCREASED RISK FOR CARDIOVASCULAR DISEASE: ICD-10-CM

## 2025-04-15 DIAGNOSIS — Z23 NEED FOR SHINGLES VACCINE: ICD-10-CM

## 2025-04-15 DIAGNOSIS — E78.5 HYPERLIPIDEMIA, UNSPECIFIED HYPERLIPIDEMIA TYPE: ICD-10-CM

## 2025-04-15 PROCEDURE — 90471 IMMUNIZATION ADMIN: CPT | Performed by: FAMILY MEDICINE

## 2025-04-15 PROCEDURE — 99214 OFFICE O/P EST MOD 30 MIN: CPT | Performed by: FAMILY MEDICINE

## 2025-04-15 PROCEDURE — 3078F DIAST BP <80 MM HG: CPT | Performed by: FAMILY MEDICINE

## 2025-04-15 PROCEDURE — 90750 HZV VACC RECOMBINANT IM: CPT | Performed by: FAMILY MEDICINE

## 2025-04-15 PROCEDURE — 3075F SYST BP GE 130 - 139MM HG: CPT | Performed by: FAMILY MEDICINE

## 2025-04-15 PROCEDURE — 1036F TOBACCO NON-USER: CPT | Performed by: FAMILY MEDICINE

## 2025-04-15 ASSESSMENT — ENCOUNTER SYMPTOMS
SHORTNESS OF BREATH: 0
HEADACHES: 0
HYPERTENSION: 1

## 2025-04-15 ASSESSMENT — PATIENT HEALTH QUESTIONNAIRE - PHQ9
1. LITTLE INTEREST OR PLEASURE IN DOING THINGS: NOT AT ALL
SUM OF ALL RESPONSES TO PHQ9 QUESTIONS 1 AND 2: 0
2. FEELING DOWN, DEPRESSED OR HOPELESS: NOT AT ALL

## 2025-04-15 NOTE — PROGRESS NOTES
"Dmitri Dotson \"Kemar\" is a 64 y.o. male who presents for Hypertension.    Hypertension  Associated symptoms include anxiety. Pertinent negatives include no headaches or shortness of breath.   Pt is here today for hypertension recheck.  He is on losartan 100 mg daily.  He had a stress test in march 2025 due to atypical chest pain - negative.  He has not yet followed up with cardiology.  He is requesting referral to new cardiologist.  He reports chest pain when he has \"gas\" however when he burps or passes gas this chest pain resolves.  He did see cardiology for his chest pain in Feb 2025.     He had labs done in feb 2025 showing total cholesterol of 225, LDL of 143.    CAC score of 0 in 1/2025.  His CT did show mild thoracic aortic dilatation.    Prevnar 20 vaccine declined today     Review of Systems   Respiratory:  Negative for shortness of breath.    Neurological:  Negative for headaches.       Objective     Vitals:    04/15/25 0941 04/15/25 1024   BP: 135/78 138/88   BP Location: Left arm    Patient Position: Sitting    Pulse: 60    Resp: 18    Temp: 36.4 °C (97.6 °F)    TempSrc: Temporal    SpO2: 95%    Weight: 73.5 kg (162 lb)         Current Outpatient Medications   Medication Instructions    blood pressure monitor (Blood Pressure Kit) kit 1 kit, miscellaneous, Daily PRN    losartan (COZAAR) 100 mg, oral, Daily    tadalafil (CIALIS) 5 mg, oral, Daily    triamcinolone (Kenalog) 0.1 % cream Topical, 2 times daily        No Known Allergies     Past Surgical History:   Procedure Laterality Date    OTHER SURGICAL HISTORY  09/17/2021    Stomach surgery    OTHER SURGICAL HISTORY  09/17/2021    Tonsillectomy    OTHER SURGICAL HISTORY  06/15/2022    Hernia repair    OTHER SURGICAL HISTORY  06/15/2022    Appendectomy        Social History     Tobacco Use    Smoking status: Never    Smokeless tobacco: Never   Vaping Use    Vaping status: Never Used   Substance Use Topics    Alcohol use: Not Currently     " "Comment: stopped 2023.    Drug use: Not Currently     Types: Marijuana     Comment: Hx:  THC Gummies. (Quit 2025).        Family History   Problem Relation Name Age of Onset    Diabetes Father      Breast cancer Paternal Grandmother          Immunization History   Administered Date(s) Administered    COVID-19, mRNA, LNP-S, PF, 30 mcg/0.3 mL dose 05/04/2021, 05/24/2021, 12/11/2021    Flu vaccine, trivalent, preservative free, age 6 months and greater (Fluarix/Fluzone/Flulaval) 10/18/2024    Pfizer COVID-19 vaccine, bivalent, age 12 years and older (30 mcg/0.3 mL) 10/22/2022    Td vaccine, age 7 years and older (TDVAX) 01/01/2000    Tdap vaccine, age 7 year and older (BOOSTRIX, ADACEL) 04/03/2015, 09/17/2021    Zoster vaccine, recombinant, adult (SHINGRIX) 09/17/2021        Lab Results   Component Value Date    WBC 4.3 (L) 10/21/2024    RBC 5.35 10/21/2024    HGB 15.7 10/21/2024    HCT 47.0 10/21/2024    MCV 88 10/21/2024    MCH 29.3 10/21/2024    MCHC 33.4 10/21/2024     10/21/2024     Lab Results   Component Value Date    GLUCOSE 93 02/28/2025     02/28/2025    K 4.5 02/28/2025     02/28/2025    CO2 28 02/28/2025    ANIONGAP 9 02/28/2025    BUN 16 02/28/2025    CREATININE 1.01 02/28/2025    CALCIUM 9.3 02/28/2025    ALBUMIN 4.5 02/28/2025    ALKPHOS 58 02/28/2025    PROT 6.7 02/28/2025    AST 23 02/28/2025    BILITOT 0.6 02/28/2025    ALT 26 02/28/2025      Lab Results   Component Value Date    CHOL 225 (H) 02/28/2025    HDL 66 02/28/2025    CHHDL 3.4 02/28/2025    LDLCALC 143 (H) 02/28/2025    VLDL 16 10/21/2024    TRIG 65 02/28/2025      Lab Results   Component Value Date    TSH 2.52 10/21/2024      No results found for: \"VITD25\"   Lab Results   Component Value Date    HGBA1C 5.7 (H) 02/28/2025    OINMRKAX4K 117 02/28/2025       Physical Exam  Vitals reviewed.   Constitutional:       General: He is not in acute distress.     Appearance: Normal appearance. He is not ill-appearing.   HENT:      " Head: Atraumatic.   Neck:      Thyroid: No thyroid mass or thyromegaly.   Cardiovascular:      Rate and Rhythm: Normal rate and regular rhythm.      Heart sounds: No murmur heard.  Pulmonary:      Effort: No respiratory distress.      Breath sounds: Normal breath sounds. No wheezing, rhonchi or rales.   Lymphadenopathy:      Cervical: No cervical adenopathy.   Skin:     General: Skin is warm and dry.      Findings: No rash.   Neurological:      Mental Status: He is alert and oriented to person, place, and time. Mental status is at baseline.   Psychiatric:         Mood and Affect: Mood normal.         Behavior: Behavior normal.         Assessment & Plan  Hypertension, uncontrolled  Elevated today, recommend amlodipine 2.5 mg daily in addition to his losartan 100 but patient declines.  He wants to discuss with his cardiologist.  Keep bp log.  Avoid salty foods.    Orders:    Referral to Cardiology; Future    Follow Up In Primary Care - Established; Future    Hyperlipidemia, unspecified hyperlipidemia type  Not on statin.  Pt declined   Orders:    Referral to Cardiology; Future    Follow Up In Primary Care - Established; Future    Ectatic thoracic aorta (CMS-HCC)  Follow up with cardiology for monitoring   Orders:    Referral to Cardiology; Future    At increased risk for cardiovascular disease  16.3% ASCVD risk today - declines statin, will discuss with cardiology  Orders:    Referral to Cardiology; Future    Chest pain, atypical  Negative stress test, will see cardiology.      Orders:    Referral to Cardiology; Future    Need for shingles vaccine  2nd shingrix completed today  Orders:    Zoster vaccine, recombinant, adult (SHINGRIX)

## 2025-04-15 NOTE — ASSESSMENT & PLAN NOTE
Elevated today, recommend amlodipine 2.5 mg daily in addition to his losartan 100 but patient declines.  He wants to discuss with his cardiologist.  Keep bp log.  Avoid salty foods.    Orders:    Referral to Cardiology; Future    Follow Up In Primary Care - Established; Future

## 2025-04-15 NOTE — ASSESSMENT & PLAN NOTE
Not on statin.  Pt declined   Orders:    Referral to Cardiology; Future    Follow Up In Primary Care - Established; Future

## 2025-04-15 NOTE — ASSESSMENT & PLAN NOTE
16.3% ASCVD risk today - declines statin, will discuss with cardiology  Orders:    Referral to Cardiology; Future

## 2025-04-30 ENCOUNTER — APPOINTMENT (OUTPATIENT)
Dept: ORTHOPEDIC SURGERY | Facility: CLINIC | Age: 65
End: 2025-04-30
Payer: COMMERCIAL

## 2025-05-01 ENCOUNTER — APPOINTMENT (OUTPATIENT)
Dept: CARDIOLOGY | Facility: CLINIC | Age: 65
End: 2025-05-01
Payer: COMMERCIAL

## 2025-05-01 VITALS
BODY MASS INDEX: 25.99 KG/M2 | HEART RATE: 51 BPM | OXYGEN SATURATION: 93 % | HEIGHT: 65 IN | WEIGHT: 156 LBS | SYSTOLIC BLOOD PRESSURE: 148 MMHG | DIASTOLIC BLOOD PRESSURE: 82 MMHG

## 2025-05-01 DIAGNOSIS — I10 HYPERTENSION, UNCONTROLLED: ICD-10-CM

## 2025-05-01 DIAGNOSIS — M19.90 OSTEOARTHRITIS, UNSPECIFIED OSTEOARTHRITIS TYPE, UNSPECIFIED SITE: Primary | ICD-10-CM

## 2025-05-01 DIAGNOSIS — E78.5 HYPERLIPIDEMIA, UNSPECIFIED HYPERLIPIDEMIA TYPE: ICD-10-CM

## 2025-05-01 DIAGNOSIS — R53.83 FATIGUE, UNSPECIFIED TYPE: ICD-10-CM

## 2025-05-01 DIAGNOSIS — R00.1 BRADYCARDIA: ICD-10-CM

## 2025-05-01 DIAGNOSIS — R07.89 CHEST PAIN, ATYPICAL: ICD-10-CM

## 2025-05-01 DIAGNOSIS — R00.2 PALPITATIONS: ICD-10-CM

## 2025-05-01 DIAGNOSIS — R06.83 SNORES: ICD-10-CM

## 2025-05-01 DIAGNOSIS — I77.810 ECTATIC THORACIC AORTA (CMS-HCC): ICD-10-CM

## 2025-05-01 DIAGNOSIS — Z91.89 AT INCREASED RISK FOR CARDIOVASCULAR DISEASE: ICD-10-CM

## 2025-05-01 DIAGNOSIS — Z90.49 STATUS POST APPENDECTOMY: ICD-10-CM

## 2025-05-01 DIAGNOSIS — R94.31 ABNORMAL EKG: ICD-10-CM

## 2025-05-01 DIAGNOSIS — M54.12 CERVICAL RADICULOPATHY: ICD-10-CM

## 2025-05-01 DIAGNOSIS — K21.9 GASTROESOPHAGEAL REFLUX DISEASE WITHOUT ESOPHAGITIS: ICD-10-CM

## 2025-05-01 RX ORDER — ROSUVASTATIN CALCIUM 5 MG/1
5 TABLET, COATED ORAL DAILY
Qty: 90 TABLET | Refills: 3 | Status: SHIPPED | OUTPATIENT
Start: 2025-05-01 | End: 2026-05-01

## 2025-05-01 NOTE — PATIENT INSTRUCTIONS
Exercise diet weight loss program.    Hydrate    Use My Chart portal for reviewing records, testing and contacting office.     Get testing prior to next visit

## 2025-05-01 NOTE — PROGRESS NOTES
CARDIOLOGY CONSULTATION NOTE       Patient:    Jericho Dotson    YOB: 1960  MRN:    [id number]    Date:   5/1/2025    Primary Physician: Kevin Prather DO       REASON FOR CONSULT / CHIEF COMPLAINT:      Initial cardiology consultation for dilated ascending aorta and chest pain.    IMPRESSION:      Chest pain  Palpitations  Snores  Fatigue  Abnormal electrocardiogram  Sinus bradycardia  Incomplete right bundle branch block  Left ventricular hypertrophy  Dilated ascending aorta, 4.0 cm, CT 1/2025.  Negative coronary CT calcium score, 0, 1/2025.  Hypertension  Hyperlipidemia  Hyperglycemia, prediabetic  Degenerative joint disease  Anxiousness  Prior appendectomy 2023  Prior normal surgery as an infant.  Otherwise as per assessment below.      RECOMMENDATIONS:      Patient with above-noted history and findings.  He does have a chest pain symptoms with left arm paresthesias.  Etiology is unclear.  He does have significant degenerative joint disease limiting his activity.  Warrants further cardiovascular evaluation given his symptoms and risk factors with the following: Echocardiogram, 48-hour Holter monitor, cardiac CT angiography with flow dynamics.  Further recommendations were rendered following.    In the meantime his LDL cholesterol is 143 and would suggest given his risk factors that we started him on low-dose statin with Crestor 5 mg nightly.  He will continue his other medications.  Refills were provided.    Exercise dietary program.    Hydration.    [employer] portal use was encouraged.    We will plan to see back following the above testing with Laboratory Studies and ECG as noted.     Patient will follow up with their primary physician for general care.    The patient knows to contact medical care earlier if need be.      HPI:     Jericho Dotson was seen in cardiac evaluation at the [facility] office May 1, 2025.      The patients problems are listed as in the impression  above.    Electronic medical records reviewed.    64-year-old hypertensive, hyperlipidemic gentleman with history of chest pain for which which he saw his primary care physician and cardiology prior.  Had a negative exercise treadmill stress test 2/2025.  He had a CT calcium score which was negative with a score of 0 in January 2025.  CT of the chest did show a mild thoracic aortic dilatation.      He has continued to have symptoms chest pains.  He is anxious about this.  It is relieved with burping and passing gas most of the time..  He does still have a component of exertional chest pain and left arm discomfort and has significant degenerative joint disease.  He does have significant degenerative joint disease with recent steroid injection to the right knee with cervical disc disease with radiculopathy.  8    He states that he does occasionally use Tums with partial relief of the symptoms at times.  She notes of palpitations.  AAF does admit to snoring and fatigue during but has had no noted apnea.  He has no other significant cardiovascular complaints.    Patient denies er SOB, Lightheadedness, Dizziness, TIA or CVA symptoms.  No CHF or Edema. No GI,  or Bleeding Issues. No Recent Fever or Chills.     Cardiovascular and general review of systems is otherwise negative.    A 14-system review is otherwise negative, other than noted.    ALLERGIES:     Patient has no known allergies.    MEDICATIONS:     Current Outpatient Medications   Medication Instructions    blood pressure monitor (Blood Pressure Kit) kit 1 kit, miscellaneous, Daily PRN    losartan (COZAAR) 100 mg, oral, Daily    tadalafil (CIALIS) 5 mg, oral, Daily    triamcinolone (Kenalog) 0.1 % cream Topical, 2 times daily     PAST MEDICAL HISTORY:   As per impression above.  No other significant past medical or surgical history appreciated.    SOCIAL HISTORY:   .  Children grown.  [employer] worker.  Never smoked.  Past heavy alcohol use.  None  current.  No illicit drug.    FAMILY HISTORY:   Negative family history of CAD  Father with diabetes and morbid obesity.    VITALS:     Vitals:    05/01/25 0909   BP: 148/82   Pulse: 51   SpO2: 93%       Wt Readings from Last 4 Encounters:   05/01/25 70.8 kg (156 lb)   04/15/25 73.5 kg (162 lb)   02/28/25 71.2 kg (157 lb)   02/24/25 73 kg (161 lb)       PHYSICAL EXAMINATION:      General: No acute distress. Alert and oriented.  Head And Neck Examination: No jugular venous distention, no carotid bruits, no mass. Carotid upstrokes preserved. Oral mucosa moist.  No xanthelasma. Head and neck examination otherwise unremarkable.  Lungs: Clear to auscultation and percussion. No wheezes, no rales,  and no rhonchi.  Chest: Excursion appeared to be normal. No chest wall tenderness on palpation.  Heart: Normal S1 and S2. No S3. No S4. No rub. Grade 1/6 systolic murmur, best heard at the left sternal border. Point of maximal impulse was within normal limits.  Abdomen: Soft. Nontender. No organomegaly. No bruits. No masses.  Overweight  Extremities: No bipedal edema. No clubbing. No cyanosis.  Pulses are strong throughout. No bruits.  Musculoskeletal Exam: No ulcers, otherwise unremarkable.  Neuro: Neurologically appeared grossly intact.    ELECTROCARDIOGRAM:      Sinus bradycardia, rate 50, incomplete right bundle branch block, left ventricular hypertrophy.  Secondary ST-T wave changes.    CARDIAC TESTING:      None this visit    LABORATORY DATA:      CBC:   Lab Results   Component Value Date    WBC 4.3 (L) 10/21/2024    RBC 5.35 10/21/2024    HGB 15.7 10/21/2024    HCT 47.0 10/21/2024     10/21/2024        CMP:    Lab Results   Component Value Date     02/28/2025    K 4.5 02/28/2025     02/28/2025    CO2 28 02/28/2025    BUN 16 02/28/2025    CREATININE 1.01 02/28/2025    GLUCOSE 93 02/28/2025    CALCIUM 9.3 02/28/2025       Lipid Profile:    Lab Results   Component Value Date    CHOL 225 (H) 02/28/2025     TRIG 65 02/28/2025    HDL 66 02/28/2025    LDLCALC 143 (H) 02/28/2025       Hepatic Function Panel:    Lab Results   Component Value Date    ALKPHOS 58 02/28/2025    ALT 26 02/28/2025    AST 23 02/28/2025    PROT 6.7 02/28/2025    BILITOT 0.6 02/28/2025    BILIDIR 0.1 05/06/2022       TSH:    Lab Results   Component Value Date    TSH 2.52 10/21/2024       HgBA1c:    Lab Results   Component Value Date    HGBA1C 5.7 (H) 02/28/2025     PT/INR:    Lab Results   Component Value Date    PROTIME 12.4 05/07/2022    INR 1.1 05/07/2022                 PROBLEM LIST:     Problem List[1]          Truman Aguilera MD, [facility] /  Cardiology      Of Note:  [employer] voice recognition dictation software was utilized partially in the preparation of this note, therefore, inaccuracies in spelling, word choice and punctuation may have occurred which were not recognized at the time of signing.    Patient was seen and examined with total time of visit including chart preparation, rooming, and chart completion exceeding 40 minutes.                  [1]   Patient Active Problem List  Diagnosis    Hypertension, uncontrolled    Hyperlipidemia    Chest pain, atypical    Arthritis of hand, left    Ectatic thoracic aorta (CMS-HCC)    At increased risk for cardiovascular disease

## 2025-05-02 ENCOUNTER — OFFICE VISIT (OUTPATIENT)
Dept: ORTHOPEDIC SURGERY | Facility: CLINIC | Age: 65
End: 2025-05-02
Payer: COMMERCIAL

## 2025-05-02 ENCOUNTER — APPOINTMENT (OUTPATIENT)
Dept: DERMATOLOGY | Facility: CLINIC | Age: 65
End: 2025-05-02
Payer: COMMERCIAL

## 2025-05-02 ENCOUNTER — HOSPITAL ENCOUNTER (OUTPATIENT)
Dept: RADIOLOGY | Facility: CLINIC | Age: 65
Discharge: HOME | End: 2025-05-02
Payer: COMMERCIAL

## 2025-05-02 DIAGNOSIS — M54.12 CERVICAL RADICULOPATHY: ICD-10-CM

## 2025-05-02 DIAGNOSIS — M48.02 STENOSIS OF CERVICAL SPINE: ICD-10-CM

## 2025-05-02 DIAGNOSIS — M54.2 CERVICAL PAIN: ICD-10-CM

## 2025-05-02 DIAGNOSIS — L57.0 ACTINIC KERATOSES: Primary | ICD-10-CM

## 2025-05-02 DIAGNOSIS — L57.9 SKIN CHANGES DUE TO CHRONIC EXPOSURE TO NONIONIZING RADIATION: ICD-10-CM

## 2025-05-02 PROCEDURE — 99213 OFFICE O/P EST LOW 20 MIN: CPT | Performed by: INTERNAL MEDICINE

## 2025-05-02 PROCEDURE — 17003 DESTRUCT PREMALG LES 2-14: CPT | Performed by: STUDENT IN AN ORGANIZED HEALTH CARE EDUCATION/TRAINING PROGRAM

## 2025-05-02 PROCEDURE — 17000 DESTRUCT PREMALG LESION: CPT | Performed by: STUDENT IN AN ORGANIZED HEALTH CARE EDUCATION/TRAINING PROGRAM

## 2025-05-02 PROCEDURE — 72040 X-RAY EXAM NECK SPINE 2-3 VW: CPT

## 2025-05-02 RX ORDER — OXYCODONE AND ACETAMINOPHEN 5; 325 MG/1; MG/1
1 TABLET ORAL EVERY 12 HOURS PRN
Qty: 14 TABLET | Refills: 0 | Status: SHIPPED | OUTPATIENT
Start: 2025-05-02 | End: 2025-05-09

## 2025-05-02 RX ORDER — PREDNISONE 50 MG/1
50 TABLET ORAL DAILY
Qty: 5 TABLET | Refills: 0 | Status: SHIPPED | OUTPATIENT
Start: 2025-05-02 | End: 2025-05-07

## 2025-05-02 NOTE — LETTER
May 2, 2025     Patient: Jericho Dotson   YOB: 1960   Date of Visit: 5/2/2025       To Whom It May Concern:    It is my medical opinion that Jericho Dotson should remain off of work until after MRI is completed due to injury.    If you have any questions or concerns, please don't hesitate to call.         Sincerely,          Horacio Menon MD    CC: Mary Beth Miller MA

## 2025-05-02 NOTE — PROGRESS NOTES
"Subjective     Jericho Dotson \"Kemar\" is a 64 y.o. male who presents for the following: Actinic Keratosis (Returning for LN2 to AK's on face. ).          Review of Systems:  No other skin or systemic complaints other than what is documented elsewhere in the note.    The following portions of the chart were reviewed this encounter and updated as appropriate:          Skin Cancer History  Biopsy Log Book  No skin cancers from Specimen Tracking.    Additional History      Specialty Problems    None       Objective   Well appearing patient in no apparent distress; mood and affect are within normal limits.    A focused skin examination was performed. All findings within normal limits unless otherwise noted below.    Assessment/Plan   Skin Exam  1. ACTINIC KERATOSES (5)  Left Ear, Left Forehead, Right Malar Cheek, Right Zoroastrianism, Right Zygomatic Area  Erythematous gritty macule(s)  Lesions are due to chronic, cumulative sun damage over time and are pre-cancerous. They have a risk of developing into squamous cell carcinoma and therefore treatment recommendations were offered and discussed with the patient. Discussed LN2 & topical chemotherapy creams, risks and benefits of each. The risks and benefits of LN2 were reviewed including incomplete removal, crusting, blister hypo and/or hyperpigmentation, scarring. The patient elected for LN2 today.  Destr of lesion - Left Ear, Left Forehead, Right Malar Cheek, Right Zoroastrianism, Right Zygomatic Area  Complexity: simple    Destruction method: cryotherapy    Informed consent: discussed and consent obtained    Lesion destroyed using liquid nitrogen: Yes    Region frozen until ice ball extended beyond lesion: Yes    Cryotherapy cycles:  1  Outcome: patient tolerated procedure well with no complications    Post-procedure details: wound care instructions given    2. SKIN CHANGES DUE TO CHRONIC EXPOSURE TO NONIONIZING RADIATION      Related Procedures  Follow Up In Dermatology - Established " Patient  Follow Up In Dermatology - Established Patient

## 2025-05-02 NOTE — PROGRESS NOTES
"  Acute Injury New Patient Visit    CC:   Chief Complaint   Patient presents with    Neck - Numbness, Pain     Radiates down back, into shoulder, having numbness in arms   States had flare 10 years ago  No injury  Xrays today       HPI: Jericho \"Joie" is a 64 y.o. male presents today for evaluation for acute on chronic neck pain which flared up two days ago. No trauma or fall. He states that the pain radiates down his spine and right shoulder, tingling and numbness to his fingers. He is here for initial evaluation and x-rays.         Review of Systems   GENERAL: Negative for malaise, significant weight loss, fever  MUSCULOSKELETAL: See HPI  NEURO:  Negative for numbness / tingling     Past Medical History  Medical History[1]    Medication review  Medication Documentation Review Audit       Reviewed by Caroline Fletcher MA (Medical Assistant) on 05/02/25 at 1404      Medication Order Taking? Sig Documenting Provider Last Dose Status   blood pressure monitor (Blood Pressure Kit) kit [id number]  1 kit once daily as needed (check daily at different times of day). Linda Slater DO  Active   losartan (Cozaar) 100 mg tablet [id number]  Take 1 tablet (100 mg) by mouth once daily. Ubaldo Bedoya MD  Active   rosuvastatin (Crestor) 5 mg tablet [id number]  Take 1 tablet (5 mg) by mouth once daily. Truman Aguilera MD  Active   tadalafil (Cialis) 5 mg tablet [id number]  Take 1 tablet (5 mg) by mouth once daily. Abhilash Tellez MD  Active   triamcinolone (Kenalog) 0.1 % cream [id number]  Apply topically 2 times a day. Kevin Prather DO  Active                    Allergies  RX Allergies[2]    Social History  Social History     Socioeconomic History    Marital status:      Spouse name: Not on file    Number of children: Not on file    Years of education: Not on file    Highest education level: Not on file   Occupational History    Not on file   Tobacco Use    Smoking status: Never    Smokeless tobacco: Never " "  Vaping Use    Vaping status: Never Used   Substance and Sexual Activity    Alcohol use: Not Currently     Comment: stopped 2023.    Drug use: Not Currently     Types: Marijuana     Comment: Hx:  THC Gummies. (Quit 2025).    Sexual activity: Defer   Other Topics Concern    Not on file   Social History Narrative    Not on file     Social Drivers of Health     Financial Resource Strain: Not on file   Food Insecurity: Not on file   Transportation Needs: Not on file   Physical Activity: Not on file   Stress: Not on file   Social Connections: Not on file   Intimate Partner Violence: Not on file   Housing Stability: Not on file       Surgical History  Surgical History[3]    Physical Exam:  GENERAL:  Patient is awake, alert, and oriented to person place and time.  Patient appears well nourished and well kept.  Affect Calm, Not Acutely Distressed.  HEENT:  Normocephalic, Atraumatic, EOMI  CARDIOVASCULAR:  Hemodynamically stable.  RESPIRATORY:  Normal respirations with unlabored breathing.  Extremity: Cervical spine examination shows skin is intact.  No cervical midline tenderness.  He has pain with forward flexion and reverse extension.  Pain with left and right lateral rotation.  Mild pain of the right trapezium muscle.  Positive Spurling test on the right.  Deltoid strength is 5/5 on the right, and 5/5 on left.  He can forward flex both shoulders at the 180 degrees.  Distal pulses are palpable.      Diagnostics: X-rays reviewed      Procedure: None    Assessment:   Right-sided cervical radiculopathy  Cervical stenosis  Cervical cervical degenerative disc disease    Plan: Jericho \"Kemar\" presents today for evaluation for acute on chronic neck pain which flared up two days ago, and presents with increased right sided cervical radiculopathy. We recommended physical therapy and traction therapy, MRI of the cervical spine to evaluate for right sided cervical radiculopathy, and we will place on a short course of oral prednisone " 50 mg for 5 days. We filled Percocet for breakthrough pain. He will be off work until MRI follow-up. He will follow-up with the spine team after the MRI of the cervical spine.     Orders Placed This Encounter    XR cervical spine 2-3 views      At the conclusion of the visit there were no further questions by the patient/family regarding their plan of care.  Patient was instructed to call or return with any issues, questions, or concerns regarding their injury and/or treatment plan described above.     05/02/25 at 2:29 PM - Horacio Menon MD  Scribe Attestation  By signing my name below, I, Jonah Rothmananjelica, Scribe   attest that this documentation has been prepared under the direction and in the presence of Horacio Menon MD.    Office: [phone]    This note was prepared using voice recognition software.  The details of this note are correct and have been reviewed, and corrected to the best of my ability.  Some grammatical errors may persist related to the Dragon software.         [1] History reviewed. No pertinent past medical history.  [2] No Known Allergies  [3]   Past Surgical History:  Procedure Laterality Date    OTHER SURGICAL HISTORY  09/17/2021    Stomach surgery    OTHER SURGICAL HISTORY  09/17/2021    Tonsillectomy    OTHER SURGICAL HISTORY  06/15/2022    Hernia repair    OTHER SURGICAL HISTORY  06/15/2022    Appendectomy

## 2025-05-05 ENCOUNTER — APPOINTMENT (OUTPATIENT)
Dept: RADIOLOGY | Facility: HOSPITAL | Age: 65
DRG: 552 | End: 2025-05-05
Payer: COMMERCIAL

## 2025-05-05 ENCOUNTER — HOSPITAL ENCOUNTER (INPATIENT)
Facility: HOSPITAL | Age: 65
LOS: 2 days | Discharge: HOME | DRG: 552 | End: 2025-05-07
Attending: STUDENT IN AN ORGANIZED HEALTH CARE EDUCATION/TRAINING PROGRAM | Admitting: INTERNAL MEDICINE
Payer: COMMERCIAL

## 2025-05-05 ENCOUNTER — APPOINTMENT (OUTPATIENT)
Dept: CARDIOLOGY | Facility: CLINIC | Age: 65
End: 2025-05-05
Payer: COMMERCIAL

## 2025-05-05 DIAGNOSIS — M54.12 CERVICAL RADICULOPATHY: Primary | ICD-10-CM

## 2025-05-05 DIAGNOSIS — M48.02 CERVICAL STENOSIS OF SPINAL CANAL: ICD-10-CM

## 2025-05-05 LAB
ALBUMIN SERPL BCP-MCNC: 4.4 G/DL (ref 3.4–5)
ANION GAP SERPL CALC-SCNC: 14 MMOL/L (ref 10–20)
BUN SERPL-MCNC: 22 MG/DL (ref 6–23)
CALCIUM SERPL-MCNC: 9.2 MG/DL (ref 8.6–10.3)
CHLORIDE SERPL-SCNC: 102 MMOL/L (ref 98–107)
CO2 SERPL-SCNC: 24 MMOL/L (ref 21–32)
CREAT SERPL-MCNC: 1 MG/DL (ref 0.5–1.3)
EGFRCR SERPLBLD CKD-EPI 2021: 85 ML/MIN/1.73M*2
ERYTHROCYTE [DISTWIDTH] IN BLOOD BY AUTOMATED COUNT: 12 % (ref 11.5–14.5)
GLUCOSE SERPL-MCNC: 105 MG/DL (ref 74–99)
HCT VFR BLD AUTO: 50.2 % (ref 41–52)
HGB BLD-MCNC: 16.7 G/DL (ref 13.5–17.5)
INR PPP: 1 (ref 0.9–1.1)
MCH RBC QN AUTO: 29.5 PG (ref 26–34)
MCHC RBC AUTO-ENTMCNC: 33.3 G/DL (ref 32–36)
MCV RBC AUTO: 89 FL (ref 80–100)
NRBC BLD-RTO: 0 /100 WBCS (ref 0–0)
PHOSPHATE SERPL-MCNC: 4.1 MG/DL (ref 2.5–4.9)
PLATELET # BLD AUTO: 195 X10*3/UL (ref 150–450)
POTASSIUM SERPL-SCNC: 3.8 MMOL/L (ref 3.5–5.3)
PROTHROMBIN TIME: 10.7 SECONDS (ref 9.8–12.4)
RBC # BLD AUTO: 5.67 X10*6/UL (ref 4.5–5.9)
SODIUM SERPL-SCNC: 136 MMOL/L (ref 136–145)
WBC # BLD AUTO: 8.4 X10*3/UL (ref 4.4–11.3)

## 2025-05-05 PROCEDURE — 96376 TX/PRO/DX INJ SAME DRUG ADON: CPT

## 2025-05-05 PROCEDURE — 96372 THER/PROPH/DIAG INJ SC/IM: CPT

## 2025-05-05 PROCEDURE — 2500000004 HC RX 250 GENERAL PHARMACY W/ HCPCS (ALT 636 FOR OP/ED)

## 2025-05-05 PROCEDURE — 99223 1ST HOSP IP/OBS HIGH 75: CPT | Performed by: PHYSICIAN ASSISTANT

## 2025-05-05 PROCEDURE — 99285 EMERGENCY DEPT VISIT HI MDM: CPT | Mod: 25 | Performed by: STUDENT IN AN ORGANIZED HEALTH CARE EDUCATION/TRAINING PROGRAM

## 2025-05-05 PROCEDURE — 99222 1ST HOSP IP/OBS MODERATE 55: CPT | Performed by: INTERNAL MEDICINE

## 2025-05-05 PROCEDURE — 2500000002 HC RX 250 W HCPCS SELF ADMINISTERED DRUGS (ALT 637 FOR MEDICARE OP, ALT 636 FOR OP/ED): Performed by: STUDENT IN AN ORGANIZED HEALTH CARE EDUCATION/TRAINING PROGRAM

## 2025-05-05 PROCEDURE — 99285 EMERGENCY DEPT VISIT HI MDM: CPT | Performed by: STUDENT IN AN ORGANIZED HEALTH CARE EDUCATION/TRAINING PROGRAM

## 2025-05-05 PROCEDURE — 80069 RENAL FUNCTION PANEL: CPT

## 2025-05-05 PROCEDURE — 2500000005 HC RX 250 GENERAL PHARMACY W/O HCPCS: Performed by: INTERNAL MEDICINE

## 2025-05-05 PROCEDURE — 1100000001 HC PRIVATE ROOM DAILY

## 2025-05-05 PROCEDURE — 72125 CT NECK SPINE W/O DYE: CPT | Performed by: STUDENT IN AN ORGANIZED HEALTH CARE EDUCATION/TRAINING PROGRAM

## 2025-05-05 PROCEDURE — 85027 COMPLETE CBC AUTOMATED: CPT

## 2025-05-05 PROCEDURE — 2500000005 HC RX 250 GENERAL PHARMACY W/O HCPCS

## 2025-05-05 PROCEDURE — 96374 THER/PROPH/DIAG INJ IV PUSH: CPT | Mod: 59

## 2025-05-05 PROCEDURE — 2500000002 HC RX 250 W HCPCS SELF ADMINISTERED DRUGS (ALT 637 FOR MEDICARE OP, ALT 636 FOR OP/ED): Performed by: INTERNAL MEDICINE

## 2025-05-05 PROCEDURE — 2550000001 HC RX 255 CONTRASTS: Mod: JW | Performed by: INTERNAL MEDICINE

## 2025-05-05 PROCEDURE — 2500000001 HC RX 250 WO HCPCS SELF ADMINISTERED DRUGS (ALT 637 FOR MEDICARE OP): Performed by: INTERNAL MEDICINE

## 2025-05-05 PROCEDURE — 72156 MRI NECK SPINE W/O & W/DYE: CPT | Performed by: RADIOLOGY

## 2025-05-05 PROCEDURE — 85610 PROTHROMBIN TIME: CPT

## 2025-05-05 PROCEDURE — 36415 COLL VENOUS BLD VENIPUNCTURE: CPT

## 2025-05-05 PROCEDURE — 2500000004 HC RX 250 GENERAL PHARMACY W/ HCPCS (ALT 636 FOR OP/ED): Mod: JZ

## 2025-05-05 PROCEDURE — 2500000004 HC RX 250 GENERAL PHARMACY W/ HCPCS (ALT 636 FOR OP/ED): Mod: JZ | Performed by: INTERNAL MEDICINE

## 2025-05-05 PROCEDURE — A9575 INJ GADOTERATE MEGLUMI 0.1ML: HCPCS | Mod: JW | Performed by: INTERNAL MEDICINE

## 2025-05-05 PROCEDURE — 2500000001 HC RX 250 WO HCPCS SELF ADMINISTERED DRUGS (ALT 637 FOR MEDICARE OP)

## 2025-05-05 PROCEDURE — 72125 CT NECK SPINE W/O DYE: CPT

## 2025-05-05 PROCEDURE — 2500000001 HC RX 250 WO HCPCS SELF ADMINISTERED DRUGS (ALT 637 FOR MEDICARE OP): Performed by: STUDENT IN AN ORGANIZED HEALTH CARE EDUCATION/TRAINING PROGRAM

## 2025-05-05 PROCEDURE — 72156 MRI NECK SPINE W/O & W/DYE: CPT

## 2025-05-05 RX ORDER — AMOXICILLIN 250 MG
1 CAPSULE ORAL 2 TIMES DAILY
Status: DISCONTINUED | OUTPATIENT
Start: 2025-05-05 | End: 2025-05-07 | Stop reason: HOSPADM

## 2025-05-05 RX ORDER — GABAPENTIN 300 MG/1
300 CAPSULE ORAL EVERY 8 HOURS SCHEDULED
Status: DISCONTINUED | OUTPATIENT
Start: 2025-05-05 | End: 2025-05-06

## 2025-05-05 RX ORDER — OXYCODONE HYDROCHLORIDE 5 MG/1
5 TABLET ORAL ONCE
Refills: 0 | Status: COMPLETED | OUTPATIENT
Start: 2025-05-05 | End: 2025-05-05

## 2025-05-05 RX ORDER — KETOROLAC TROMETHAMINE 30 MG/ML
30 INJECTION, SOLUTION INTRAMUSCULAR; INTRAVENOUS EVERY 6 HOURS PRN
Status: DISCONTINUED | OUTPATIENT
Start: 2025-05-05 | End: 2025-05-07 | Stop reason: HOSPADM

## 2025-05-05 RX ORDER — OXYCODONE HYDROCHLORIDE 5 MG/1
5 TABLET ORAL EVERY 4 HOURS PRN
Refills: 0 | Status: DISCONTINUED | OUTPATIENT
Start: 2025-05-05 | End: 2025-05-06

## 2025-05-05 RX ORDER — GABAPENTIN 300 MG/1
300 CAPSULE ORAL EVERY 8 HOURS SCHEDULED
Qty: 90 CAPSULE | Refills: 1 | Status: CANCELLED | OUTPATIENT
Start: 2025-05-05 | End: 2025-06-04

## 2025-05-05 RX ORDER — GABAPENTIN 100 MG/1
100 CAPSULE ORAL ONCE
Status: COMPLETED | OUTPATIENT
Start: 2025-05-05 | End: 2025-05-05

## 2025-05-05 RX ORDER — LIDOCAINE 560 MG/1
1 PATCH PERCUTANEOUS; TOPICAL; TRANSDERMAL ONCE
Status: COMPLETED | OUTPATIENT
Start: 2025-05-05 | End: 2025-05-05

## 2025-05-05 RX ORDER — METHOCARBAMOL 500 MG/1
500 TABLET, FILM COATED ORAL ONCE
Status: COMPLETED | OUTPATIENT
Start: 2025-05-05 | End: 2025-05-05

## 2025-05-05 RX ORDER — ROSUVASTATIN CALCIUM 5 MG/1
5 TABLET, COATED ORAL DAILY
Status: DISCONTINUED | OUTPATIENT
Start: 2025-05-05 | End: 2025-05-07 | Stop reason: HOSPADM

## 2025-05-05 RX ORDER — POLYETHYLENE GLYCOL 3350 17 G/17G
17 POWDER, FOR SOLUTION ORAL DAILY
Status: DISCONTINUED | OUTPATIENT
Start: 2025-05-05 | End: 2025-05-07 | Stop reason: HOSPADM

## 2025-05-05 RX ORDER — LIDOCAINE 560 MG/1
1 PATCH PERCUTANEOUS; TOPICAL; TRANSDERMAL DAILY
Status: DISCONTINUED | OUTPATIENT
Start: 2025-05-05 | End: 2025-05-07 | Stop reason: HOSPADM

## 2025-05-05 RX ORDER — ACETAMINOPHEN 325 MG/1
975 TABLET ORAL ONCE
Status: COMPLETED | OUTPATIENT
Start: 2025-05-05 | End: 2025-05-05

## 2025-05-05 RX ORDER — KETOROLAC TROMETHAMINE 15 MG/ML
15 INJECTION, SOLUTION INTRAMUSCULAR; INTRAVENOUS ONCE
Status: COMPLETED | OUTPATIENT
Start: 2025-05-05 | End: 2025-05-05

## 2025-05-05 RX ORDER — ACETAMINOPHEN 325 MG/1
975 TABLET ORAL EVERY 8 HOURS PRN
Status: DISCONTINUED | OUTPATIENT
Start: 2025-05-05 | End: 2025-05-07 | Stop reason: HOSPADM

## 2025-05-05 RX ORDER — HEPARIN SODIUM 5000 [USP'U]/ML
5000 INJECTION, SOLUTION INTRAVENOUS; SUBCUTANEOUS EVERY 8 HOURS SCHEDULED
Status: DISCONTINUED | OUTPATIENT
Start: 2025-05-05 | End: 2025-05-07 | Stop reason: HOSPADM

## 2025-05-05 RX ORDER — DIAZEPAM 5 MG/1
5 TABLET ORAL ONCE
Status: COMPLETED | OUTPATIENT
Start: 2025-05-05 | End: 2025-05-05

## 2025-05-05 RX ORDER — LOSARTAN POTASSIUM 100 MG/1
100 TABLET ORAL DAILY
Status: DISCONTINUED | OUTPATIENT
Start: 2025-05-05 | End: 2025-05-07 | Stop reason: HOSPADM

## 2025-05-05 RX ORDER — GADOTERATE MEGLUMINE 376.9 MG/ML
14 INJECTION INTRAVENOUS
Status: COMPLETED | OUTPATIENT
Start: 2025-05-05 | End: 2025-05-05

## 2025-05-05 RX ORDER — PREDNISONE 50 MG/1
50 TABLET ORAL DAILY
Status: DISCONTINUED | OUTPATIENT
Start: 2025-05-05 | End: 2025-05-07 | Stop reason: HOSPADM

## 2025-05-05 RX ORDER — SILVER SULFADIAZINE 10 G/1000G
CREAM TOPICAL DAILY
Status: CANCELLED | OUTPATIENT
Start: 2025-05-05

## 2025-05-05 RX ORDER — OXYCODONE HYDROCHLORIDE 5 MG/1
5 TABLET ORAL EVERY 4 HOURS PRN
Refills: 0 | Status: DISCONTINUED | OUTPATIENT
Start: 2025-05-05 | End: 2025-05-05

## 2025-05-05 RX ADMIN — METHOCARBAMOL 500 MG: 500 TABLET ORAL at 04:03

## 2025-05-05 RX ADMIN — OXYCODONE HYDROCHLORIDE 5 MG: 5 TABLET ORAL at 10:55

## 2025-05-05 RX ADMIN — SENNOSIDES AND DOCUSATE SODIUM 1 TABLET: 50; 8.6 TABLET ORAL at 20:58

## 2025-05-05 RX ADMIN — DIAZEPAM 5 MG: 5 TABLET ORAL at 04:55

## 2025-05-05 RX ADMIN — KETOROLAC TROMETHAMINE 30 MG: 30 INJECTION, SOLUTION INTRAMUSCULAR at 22:45

## 2025-05-05 RX ADMIN — KETOROLAC TROMETHAMINE 30 MG: 30 INJECTION, SOLUTION INTRAMUSCULAR at 16:44

## 2025-05-05 RX ADMIN — HYDROMORPHONE HYDROCHLORIDE 0.5 MG: 1 INJECTION, SOLUTION INTRAMUSCULAR; INTRAVENOUS; SUBCUTANEOUS at 07:52

## 2025-05-05 RX ADMIN — LIDOCAINE 4% 1 PATCH: 40 PATCH TOPICAL at 10:55

## 2025-05-05 RX ADMIN — LIDOCAINE 4% 1 PATCH: 40 PATCH TOPICAL at 04:03

## 2025-05-05 RX ADMIN — PREDNISONE 50 MG: 50 TABLET ORAL at 12:07

## 2025-05-05 RX ADMIN — LOSARTAN POTASSIUM 100 MG: 100 TABLET, FILM COATED ORAL at 12:07

## 2025-05-05 RX ADMIN — OXYCODONE HYDROCHLORIDE 5 MG: 5 TABLET ORAL at 04:03

## 2025-05-05 RX ADMIN — POLYETHYLENE GLYCOL 3350 17 G: 17 POWDER, FOR SOLUTION ORAL at 10:59

## 2025-05-05 RX ADMIN — GABAPENTIN 100 MG: 100 CAPSULE ORAL at 04:07

## 2025-05-05 RX ADMIN — HEPARIN SODIUM 5000 UNITS: 5000 INJECTION, SOLUTION INTRAVENOUS; SUBCUTANEOUS at 12:09

## 2025-05-05 RX ADMIN — KETOROLAC TROMETHAMINE 15 MG: 15 INJECTION, SOLUTION INTRAMUSCULAR; INTRAVENOUS at 05:35

## 2025-05-05 RX ADMIN — GADOTERATE MEGLUMINE 14 ML: 376.9 INJECTION INTRAVENOUS at 08:28

## 2025-05-05 RX ADMIN — HYDROMORPHONE HYDROCHLORIDE 0.5 MG: 1 INJECTION, SOLUTION INTRAMUSCULAR; INTRAVENOUS; SUBCUTANEOUS at 06:49

## 2025-05-05 RX ADMIN — ROSUVASTATIN CALCIUM 5 MG: 5 TABLET, FILM COATED ORAL at 12:07

## 2025-05-05 RX ADMIN — GABAPENTIN 300 MG: 300 CAPSULE ORAL at 20:58

## 2025-05-05 RX ADMIN — GABAPENTIN 300 MG: 300 CAPSULE ORAL at 12:42

## 2025-05-05 RX ADMIN — ACETAMINOPHEN 975 MG: 325 TABLET ORAL at 04:03

## 2025-05-05 RX ADMIN — OXYCODONE HYDROCHLORIDE 5 MG: 5 TABLET ORAL at 16:43

## 2025-05-05 RX ADMIN — HEPARIN SODIUM 5000 UNITS: 5000 INJECTION, SOLUTION INTRAVENOUS; SUBCUTANEOUS at 20:58

## 2025-05-05 SDOH — SOCIAL STABILITY: SOCIAL INSECURITY: WITHIN THE LAST YEAR, HAVE YOU BEEN HUMILIATED OR EMOTIONALLY ABUSED IN OTHER WAYS BY YOUR PARTNER OR EX-PARTNER?: NO

## 2025-05-05 SDOH — SOCIAL STABILITY: SOCIAL INSECURITY
WITHIN THE LAST YEAR, HAVE YOU BEEN KICKED, HIT, SLAPPED, OR OTHERWISE PHYSICALLY HURT BY YOUR PARTNER OR EX-PARTNER?: NO

## 2025-05-05 SDOH — SOCIAL STABILITY: SOCIAL INSECURITY: HAVE YOU HAD THOUGHTS OF HARMING ANYONE ELSE?: YES

## 2025-05-05 SDOH — SOCIAL STABILITY: SOCIAL INSECURITY: WITHIN THE LAST YEAR, HAVE YOU BEEN AFRAID OF YOUR PARTNER OR EX-PARTNER?: NO

## 2025-05-05 SDOH — ECONOMIC STABILITY: INCOME INSECURITY: IN THE PAST 12 MONTHS HAS THE ELECTRIC, GAS, OIL, OR WATER COMPANY THREATENED TO SHUT OFF SERVICES IN YOUR HOME?: NO

## 2025-05-05 SDOH — SOCIAL STABILITY: SOCIAL INSECURITY: DO YOU FEEL UNSAFE GOING BACK TO THE PLACE WHERE YOU ARE LIVING?: NO

## 2025-05-05 SDOH — ECONOMIC STABILITY: FOOD INSECURITY: WITHIN THE PAST 12 MONTHS, YOU WORRIED THAT YOUR FOOD WOULD RUN OUT BEFORE YOU GOT THE MONEY TO BUY MORE.: NEVER TRUE

## 2025-05-05 SDOH — SOCIAL STABILITY: SOCIAL INSECURITY: HAS ANYONE EVER THREATENED TO HURT YOUR FAMILY OR YOUR PETS?: NO

## 2025-05-05 SDOH — ECONOMIC STABILITY: FOOD INSECURITY: WITHIN THE PAST 12 MONTHS, THE FOOD YOU BOUGHT JUST DIDN'T LAST AND YOU DIDN'T HAVE MONEY TO GET MORE.: NEVER TRUE

## 2025-05-05 SDOH — SOCIAL STABILITY: SOCIAL INSECURITY: ARE YOU OR HAVE YOU BEEN THREATENED OR ABUSED PHYSICALLY, EMOTIONALLY, OR SEXUALLY BY ANYONE?: NO

## 2025-05-05 SDOH — SOCIAL STABILITY: SOCIAL INSECURITY
WITHIN THE LAST YEAR, HAVE YOU BEEN RAPED OR FORCED TO HAVE ANY KIND OF SEXUAL ACTIVITY BY YOUR PARTNER OR EX-PARTNER?: NO

## 2025-05-05 SDOH — SOCIAL STABILITY: SOCIAL INSECURITY: DOES ANYONE TRY TO KEEP YOU FROM HAVING/CONTACTING OTHER FRIENDS OR DOING THINGS OUTSIDE YOUR HOME?: NO

## 2025-05-05 SDOH — SOCIAL STABILITY: SOCIAL INSECURITY: DO YOU FEEL ANYONE HAS EXPLOITED OR TAKEN ADVANTAGE OF YOU FINANCIALLY OR OF YOUR PERSONAL PROPERTY?: NO

## 2025-05-05 SDOH — SOCIAL STABILITY: SOCIAL INSECURITY: WERE YOU ABLE TO COMPLETE ALL THE BEHAVIORAL HEALTH SCREENINGS?: YES

## 2025-05-05 SDOH — SOCIAL STABILITY: SOCIAL INSECURITY: ABUSE: ADULT

## 2025-05-05 SDOH — SOCIAL STABILITY: SOCIAL INSECURITY: ARE THERE ANY APPARENT SIGNS OF INJURIES/BEHAVIORS THAT COULD BE RELATED TO ABUSE/NEGLECT?: NO

## 2025-05-05 SDOH — SOCIAL STABILITY: SOCIAL INSECURITY: HAVE YOU HAD ANY THOUGHTS OF HARMING ANYONE ELSE?: NO

## 2025-05-05 ASSESSMENT — PAIN - FUNCTIONAL ASSESSMENT
PAIN_FUNCTIONAL_ASSESSMENT: 0-10

## 2025-05-05 ASSESSMENT — PATIENT HEALTH QUESTIONNAIRE - PHQ9
2. FEELING DOWN, DEPRESSED OR HOPELESS: NOT AT ALL
SUM OF ALL RESPONSES TO PHQ9 QUESTIONS 1 & 2: 0
1. LITTLE INTEREST OR PLEASURE IN DOING THINGS: NOT AT ALL

## 2025-05-05 ASSESSMENT — COGNITIVE AND FUNCTIONAL STATUS - GENERAL
DAILY ACTIVITIY SCORE: 24
PATIENT BASELINE BEDBOUND: NO
MOBILITY SCORE: 24
DAILY ACTIVITIY SCORE: 24
MOBILITY SCORE: 24

## 2025-05-05 ASSESSMENT — ACTIVITIES OF DAILY LIVING (ADL)
TOILETING: INDEPENDENT
ADEQUATE_TO_COMPLETE_ADL: YES
JUDGMENT_ADEQUATE_SAFELY_COMPLETE_DAILY_ACTIVITIES: YES
HEARING - RIGHT EAR: FUNCTIONAL
WALKS IN HOME: INDEPENDENT
BATHING: INDEPENDENT
DRESSING YOURSELF: INDEPENDENT
PATIENT'S MEMORY ADEQUATE TO SAFELY COMPLETE DAILY ACTIVITIES?: YES
GROOMING: INDEPENDENT
LACK_OF_TRANSPORTATION: NO
HEARING - LEFT EAR: FUNCTIONAL
FEEDING YOURSELF: INDEPENDENT

## 2025-05-05 ASSESSMENT — LIFESTYLE VARIABLES
HOW OFTEN DO YOU HAVE A DRINK CONTAINING ALCOHOL: NEVER
HOW MANY STANDARD DRINKS CONTAINING ALCOHOL DO YOU HAVE ON A TYPICAL DAY: PATIENT DOES NOT DRINK
AUDIT-C TOTAL SCORE: 0
AUDIT-C TOTAL SCORE: 0
HAVE PEOPLE ANNOYED YOU BY CRITICIZING YOUR DRINKING: NO
SKIP TO QUESTIONS 9-10: 1
TOTAL SCORE: 0
EVER FELT BAD OR GUILTY ABOUT YOUR DRINKING: NO
HOW OFTEN DO YOU HAVE 6 OR MORE DRINKS ON ONE OCCASION: NEVER
HAVE YOU EVER FELT YOU SHOULD CUT DOWN ON YOUR DRINKING: NO
EVER HAD A DRINK FIRST THING IN THE MORNING TO STEADY YOUR NERVES TO GET RID OF A HANGOVER: NO

## 2025-05-05 ASSESSMENT — PAIN SCALES - GENERAL
PAINLEVEL_OUTOF10: 10 - WORST POSSIBLE PAIN
PAINLEVEL_OUTOF10: 10 - WORST POSSIBLE PAIN
PAINLEVEL_OUTOF10: 5 - MODERATE PAIN
PAINLEVEL_OUTOF10: 9
PAINLEVEL_OUTOF10: 10 - WORST POSSIBLE PAIN
PAINLEVEL_OUTOF10: 10 - WORST POSSIBLE PAIN
PAINLEVEL_OUTOF10: 7
PAINLEVEL_OUTOF10: 8
PAINLEVEL_OUTOF10: 8
PAINLEVEL_OUTOF10: 10 - WORST POSSIBLE PAIN
PAINLEVEL_OUTOF10: 8
PAINLEVEL_OUTOF10: 10 - WORST POSSIBLE PAIN
PAINLEVEL_OUTOF10: 8
PAINLEVEL_OUTOF10: 6

## 2025-05-05 ASSESSMENT — PAIN DESCRIPTION - LOCATION
LOCATION: NECK
LOCATION: ARM

## 2025-05-05 ASSESSMENT — COLUMBIA-SUICIDE SEVERITY RATING SCALE - C-SSRS
6. HAVE YOU EVER DONE ANYTHING, STARTED TO DO ANYTHING, OR PREPARED TO DO ANYTHING TO END YOUR LIFE?: NO
1. IN THE PAST MONTH, HAVE YOU WISHED YOU WERE DEAD OR WISHED YOU COULD GO TO SLEEP AND NOT WAKE UP?: NO
2. HAVE YOU ACTUALLY HAD ANY THOUGHTS OF KILLING YOURSELF?: NO

## 2025-05-05 ASSESSMENT — PAIN DESCRIPTION - ORIENTATION: ORIENTATION: RIGHT

## 2025-05-05 ASSESSMENT — PAIN DESCRIPTION - PAIN TYPE: TYPE: ACUTE PAIN

## 2025-05-05 NOTE — CARE PLAN
Problem: Pain  Goal: Takes deep breaths with improved pain control throughout the shift  Outcome: Progressing  Goal: Turns in bed with improved pain control throughout the shift  Outcome: Progressing  Goal: Walks with improved pain control throughout the shift  Outcome: Progressing  Goal: Performs ADL's with improved pain control throughout shift  Outcome: Progressing  Goal: Participates in PT with improved pain control throughout the shift  Outcome: Progressing     Problem: Pain - Adult  Goal: Verbalizes/displays adequate comfort level or baseline comfort level  Outcome: Progressing     Problem: Discharge Planning  Goal: Discharge to home or other facility with appropriate resources  Outcome: Progressing     Problem: Chronic Conditions and Co-morbidities  Goal: Patient's chronic conditions and co-morbidity symptoms are monitored and maintained or improved  Outcome: Progressing   The patient's goals for the shift include      The clinical goals for the shift include See POC

## 2025-05-05 NOTE — ED PROVIDER NOTES
EMERGENCY DEPARTMENT ENCOUNTER      Pt Name: Jericho Dotson  MRN: [id number]  Birthdate 1960  Date of evaluation: 5/5/2025  Provider: Rodrigue John DO    CHIEF COMPLAINT       Chief Complaint   Patient presents with    Arm Pain     Patient has arthritis in his neck causing unbearable pain down the patient's right arm. Patient went to the orthopedic walk in clinic on Friday. They took an xray and gave patient some pain pills. Pt to set up MRI.         HISTORY OF PRESENT ILLNESS    HPI    64-year-old male with past medical history significant for hypertension, hyperlipidemia, GERD presenting to the emergency department for evaluation of neck pain.  Patient states approximately 10 years ago he had neck pain that radiated into his upper back and into his shoulder which resolved with physical therapy.  Patient states starting approximately 4 days ago his pain recurred and radiated into his arm.  Describes his pain as an electric shocking pain with tingling and numbness throughout his arm.  Was evaluated outpatient by sports medicine on 5/2/2025 with x-ray showing moderate to severe degenerative disc disease with ventral osteophytes from C4-C7.  Sports medicine advised patient had cervical radiculopathy secondary to cervical stenosis and scheduled an MRI which has yet to be performed.  Was sent home with prescription for prednisone, Percocet and ibuprofen which she states has not helped.  Patient denies any recent falls and is not on any blood thinners.  No history of cancer.  No prior back or neck surgery.    Nursing Notes were reviewed.    PAST MEDICAL HISTORY   Medical History[1]      SURGICAL HISTORY     Surgical History[2]      CURRENT MEDICATIONS       Current Discharge Medication List        CONTINUE these medications which have NOT CHANGED    Details   losartan (Cozaar) 100 mg tablet Take 1 tablet (100 mg) by mouth once daily.  Qty: 90 tablet, Refills: 3    Associated Diagnoses: Hypertension,  uncontrolled      oxyCODONE-acetaminophen (Percocet) 5-325 mg tablet Take 1 tablet by mouth every 12 hours if needed for severe pain (7 - 10) for up to 7 days.  Qty: 14 tablet, Refills: 0    Associated Diagnoses: Cervical radiculopathy; Stenosis of cervical spine      predniSONE (Deltasone) 50 mg tablet Take 1 tablet (50 mg) by mouth once daily for 5 days.  Qty: 5 tablet, Refills: 0    Associated Diagnoses: Cervical radiculopathy; Stenosis of cervical spine      rosuvastatin (Crestor) 5 mg tablet Take 1 tablet (5 mg) by mouth once daily.  Qty: 90 tablet, Refills: 3    Associated Diagnoses: Hyperlipidemia, unspecified hyperlipidemia type; Ectatic thoracic aorta (CMS-HCC); Hypertension, uncontrolled; At increased risk for cardiovascular disease; Chest pain, atypical; Osteoarthritis, unspecified osteoarthritis type, unspecified site; Cervical radiculopathy; Palpitations; Snores; Fatigue, unspecified type; Status post appendectomy; Abnormal EKG; Bradycardia; Gastroesophageal reflux disease without esophagitis      tadalafil (Cialis) 5 mg tablet Take 1 tablet (5 mg) by mouth once daily.  Qty: 90 tablet, Refills: 3    Associated Diagnoses: Vasculogenic erectile dysfunction, unspecified vasculogenic erectile dysfunction type; BPH with lower urinary tract symptoms without urinary obstruction      blood pressure monitor (Blood Pressure Kit) kit 1 kit once daily as needed (check daily at different times of day).  Qty: 1 kit, Refills: 0    Associated Diagnoses: Hypertension, uncontrolled      triamcinolone (Kenalog) 0.1 % cream Apply topically 2 times a day.  Qty: 30 g, Refills: 0    Associated Diagnoses: Rash             ALLERGIES     Patient has no known allergies.    FAMILY HISTORY     Family History[3]       SOCIAL HISTORY     Social History[4]    SCREENINGS                        PHYSICAL EXAM    (up to 7 for level 4, 8 or more for level 5)     ED Triage Vitals   Temperature Heart Rate Respirations BP   05/05/25 0311  05/05/25 0311 05/05/25 0311 05/05/25 0311   37.1 °C (98.8 °F) 53 20 (!) 182/92      Pulse Ox Temp Source Heart Rate Source Patient Position   05/05/25 0311 05/05/25 0408 05/05/25 0408 05/05/25 0408   97 % Temporal Monitor Sitting      BP Location FiO2 (%)     05/05/25 0408 --     Right arm        Physical Exam  Vitals and nursing note reviewed.   Constitutional:       General: He is not in acute distress.     Appearance: Normal appearance. He is not ill-appearing, toxic-appearing or diaphoretic.   HENT:      Head: Normocephalic and atraumatic.      Right Ear: External ear normal.      Left Ear: External ear normal.      Nose: Nose normal.      Mouth/Throat:      Pharynx: Oropharynx is clear.   Eyes:      Conjunctiva/sclera: Conjunctivae normal.   Neck:      Comments: Patient is in a soft neck collar which he had from his prior neck pain 10 years ago.  No midline spinal tenderness.  No palpable step-offs or deformities.  Cardiovascular:      Rate and Rhythm: Regular rhythm. Bradycardia present.      Pulses: Normal pulses.      Heart sounds: Normal heart sounds.   Pulmonary:      Effort: Pulmonary effort is normal.      Breath sounds: Normal breath sounds.   Abdominal:      General: Abdomen is flat.      Palpations: Abdomen is soft.   Musculoskeletal:      Comments: Positive Spurling test on the right.  Sensation intact to pinprick and soft touch.  Normal 2 point discrimination.  Strength 5 out of 5 and symmetric in the bilateral upper extremities   Skin:     General: Skin is warm and dry.   Neurological:      General: No focal deficit present.      Mental Status: He is alert and oriented to person, place, and time.   Psychiatric:         Mood and Affect: Mood normal.         Behavior: Behavior normal.          DIAGNOSTIC RESULTS     LABS:  Labs Reviewed   RENAL FUNCTION PANEL - Abnormal       Result Value    Glucose 105 (*)     Sodium 136      Potassium 3.8      Chloride 102      Bicarbonate 24      Anion Gap 14       Urea Nitrogen 22      Creatinine 1.00      eGFR 85      Calcium 9.2      Phosphorus 4.1      Albumin 4.4     CBC - Normal    WBC 8.4      nRBC 0.0      RBC 5.67      Hemoglobin 16.7      Hematocrit 50.2      MCV 89      MCH 29.5      MCHC 33.3      RDW 12.0      Platelets 195     PROTIME-INR - Normal    Protime 10.7      INR 1.0     BASIC METABOLIC PANEL   CBC   MAGNESIUM       All other labs were within normal range or not returned as of this dictation.    Imaging  MR cervical spine w and wo IV contrast   Final Result   Multilevel degenerative changes of the cervical spine including   moderate spinal canal stenosis at C4-C5 and marked spinal canal   stenosis at C5-C6 and C6-C7. There is mass effect on the cervical   spinal cord but there is no cord signal abnormality. There is   variable degree of neural foraminal narrowing at multiple levels as   detailed above.        This study was interpreted at [facility].             MACRO:   None        Signed by: Judah Vasquez 5/5/2025 8:37 AM   Dictation workstation:   [id number]      CT cervical spine wo IV contrast   Final Result   No evidence of fracture.   Advanced degenerative cervical spondylosis with multilevel moderate   to severe bilateral foraminal stenosis as described above. There is   also suspected moderate central canal stenosis at C4-C5 and C5-C6.   Consider follow-up MRI of cervical spine to better characterize.        Signed by: Justin Amador 5/5/2025 5:18 AM   Dictation workstation:   [id number]           Procedures  Procedures     EMERGENCY DEPARTMENT COURSE/MDM:     ED Course as of 05/05/25 2028   Mon May 05, 2025   0430 Patient was sent home from outpatient orthopedics with an x-ray C-spine showing degenerative disc disease, symptoms consistent with radicular pain in the right arm with a normal neurologic exam 5/5 strength in deltoid, bicep, tricep, wrist extension, apposition thumb to pinky, finger spread,  normal gross sensation, normal sharp dull and sensation, normal reflex.  Sent home with 14 oxycodone-acetaminophen.  []   0514 Patient has received significant pain medication here lidocaine, oxycodone, Robaxin, Tylenol, gabapentin, Valium.  Patient reports no improvement of his symptoms.  He is right-hand dominant, but has normal strength.  We are not concerned for a central cord issue.  Without neurologic deficit, MRI C-spine does not appear appropriate.  We have some concern at this point for pain seeking [JH]      ED Course User Index  [] Yeyo Ashley MD         Diagnoses as of 05/05/25 2028   Cervical stenosis of spinal canal        Medical Decision Making    64-year-old male with past medical history significant for hypertension, hyperlipidemia, GERD presenting to the emergency department for evaluation of neck pain.  Patient states approximately 10 years ago he had neck pain that radiated into his upper back and into his shoulder which resolved with physical therapy.  Hypertensive on arrival with a blood pressure of 182/92 but does appear uncomfortable.  Bradycardic with heart rate of 53 which patient states is his reported baseline.  Vitals otherwise WNL.  Afebrile and nontoxic-appearing.  Given reassuring neurologic exam and patient already has a scheduled outpatient MRI oxycodone, Robaxin, Tylenol, gabapentin, Toradol and lidocaine patch were ordered to try to control patient's pain with plan for discharge if patient's pain was controlled however on reassessment patient had no improvement in his pain with medications administered so 5 mg of Valium was ordered again with no improvement in patient's pain.  Given patient has not had a recent CT scan of his C-spine with persistent pain despite multimodal pain relief, CT scan was ordered to evaluate for possible severe canal stenosis versus metastatic lesion or pathologic fracture CT shows multilevel moderate to severe bilateral foraminal stenosis as well  as suspected moderate canal stenosis at C4-C6.  Patient case discussed with Dr. Miller with neurosurgery who advised admitting patient to medicine for nonemergent MRI either today or tomorrow given we are unable to control patient's pain.  Dr. Miller advised he would be on consult and provide further recommendations once MRI results.  Patient case discussed with internal medicine attending Dr. Baires who agreed admit the patient to medicine under the care of Dr. Stanton.     Patient and or family in agreement and understanding of treatment plan.  All questions answered.      I reviewed the case with the attending ED physician. The attending ED physician agrees with the plan. Patient and/or patient´s representative was counseled regarding labs, imaging, likely diagnosis, and plan. All questions were answered.    ED Medications administered this visit:    Medications   heparin (porcine) injection 5,000 Units ( subcutaneous Canceled Entry 5/5/25 1400)   polyethylene glycol (Glycolax, Miralax) packet 17 g (17 g oral Given 5/5/25 1059)   losartan (Cozaar) tablet 100 mg (100 mg oral Given 5/5/25 1207)   rosuvastatin (Crestor) tablet 5 mg (5 mg oral Given 5/5/25 1207)   predniSONE (Deltasone) tablet 50 mg (50 mg oral Given 5/5/25 1207)   acetaminophen (Tylenol) tablet 975 mg (has no administration in time range)   sennosides-docusate sodium (Estephanie-Colace) 8.6-50 mg per tablet 1 tablet (1 tablet oral Not Given 5/5/25 0900)   gabapentin (Neurontin) capsule 300 mg (300 mg oral Given 5/5/25 1242)   lidocaine 4 % patch 1 patch (1 patch transdermal Medication Applied 5/5/25 1055)   oxyCODONE (Roxicodone) immediate release tablet 5 mg (5 mg oral Given 5/5/25 1643)   ketorolac (Toradol) injection 30 mg (30 mg intravenous Given 5/5/25 1644)   acetaminophen (Tylenol) tablet 975 mg (975 mg oral Given 5/5/25 0403)   oxyCODONE (Roxicodone) immediate release tablet 5 mg (5 mg oral Given 5/5/25 0403)   methocarbamol (Robaxin) tablet 500 mg  (500 mg oral Given 5/5/25 0403)   lidocaine 4 % patch 1 patch (0 patches transdermal Medication Removed 5/5/25 1636)   gabapentin (Neurontin) capsule 100 mg (100 mg oral Given 5/5/25 0407)   diazePAM (Valium) tablet 5 mg (5 mg oral Given 5/5/25 8485)   ketorolac (Toradol) injection 15 mg (15 mg intramuscular Given 5/5/25 0235)   HYDROmorphone (Dilaudid) injection 0.5 mg (0.5 mg intravenous Given 5/5/25 5036)   HYDROmorphone (Dilaudid) injection 0.5 mg (0.5 mg intravenous Given 5/5/25 1422)   gadoterate meglumine (Dotarem) 0.5 mmol/mL contrast injection 14 mL (14 mL intravenous Given 5/5/25 4394)       New Prescriptions from this visit:    Current Discharge Medication List          Follow-up:  Kevin Prather DO  [address]  [address]  [facility] [address]  [phone]    Follow up in 2 week(s)      Napoleon Ritter PA-C  [address]  [facility] [address]  [phone]    Follow up in 6 week(s)          Final Impression:   1. Cervical stenosis of spinal canal          (Please note that portions of this note were completed with a voice recognition program.  Efforts were made to edit the dictations but occasionally words are mis-transcribed.)       [1] No past medical history on file.  [2]   Past Surgical History:  Procedure Laterality Date    OTHER SURGICAL HISTORY  09/17/2021    Stomach surgery    OTHER SURGICAL HISTORY  09/17/2021    Tonsillectomy    OTHER SURGICAL HISTORY  06/15/2022    Hernia repair    OTHER SURGICAL HISTORY  06/15/2022    Appendectomy   [3]   Family History  Problem Relation Name Age of Onset    Diabetes Father      Breast cancer Paternal Grandmother     [4]   Social History  Socioeconomic History    Marital status:    Tobacco Use    Smoking status: Never    Smokeless tobacco: Never   Vaping Use    Vaping status: Never Used   Substance and Sexual Activity    Alcohol use: Not Currently     Comment: stopped 2023.    Drug use: Not Currently     Types: Marijuana      Comment: Hx:  THC Gummies. (Quit 2025).    Sexual activity: Defer     Social Drivers of Health     Financial Resource Strain: Low Risk  (5/5/2025)    Overall Financial Resource Strain (CARDIA)     Difficulty of Paying Living Expenses: Not hard at all   Food Insecurity: No Food Insecurity (5/5/2025)    Hunger Vital Sign     Worried About Running Out of Food in the Last Year: Never true     Ran Out of Food in the Last Year: Never true   Transportation Needs: No Transportation Needs (5/5/2025)    PRAPARE - Transportation     Lack of Transportation (Medical): No     Lack of Transportation (Non-Medical): No   Intimate Partner Violence: Not At Risk (5/5/2025)    Humiliation, Afraid, Rape, and Kick questionnaire     Fear of Current or Ex-Partner: No     Emotionally Abused: No     Physically Abused: No     Sexually Abused: No   Housing Stability: Low Risk  (5/5/2025)    Housing Stability Vital Sign     Unable to Pay for Housing in the Last Year: No     Number of Times Moved in the Last Year: 0     Homeless in the Last Year: No        Rodrigue John DO  Resident  05/05/25 2028

## 2025-05-05 NOTE — PROGRESS NOTES
Attempted to meet with patient, he is sleeping soundly. Will attempt assessment at another time.       Coleen Meidna RN

## 2025-05-05 NOTE — DISCHARGE INSTRUCTIONS
It was a pleasure to meet you in the hospital  You were diagnosed with cervical radiculopathy, a type of irritation of the nerves coming out of the spine  You will not need surgery of your cervical spine to correct this issue after review of the MRI  You were started on a medication called gabapentin as well as a patch of lidocaine and recommended to use scheduled Tylenol at a dose of 1000 mg taken 3 times a day  We also added cyclobenzaprine, a type of muscle relaxant, information on this medication is included in this discharge packet as well  I also spoke to the neurosurgery team, they recommended that you can use a medication called diclofenac twice a day  While you are on the steroid and diclofenac, we have also added a medication called pantoprazole, this is a type of acid reduction medicine  You should plan on taking this medication for 2 weeks  You initially were on steroids for a total of 5 days, we will also give you a taper for an additional 12 days  The neurosurgery team put in an outpatient referral for pain management with Dr. Florez  You will follow-up with neurosurgery in the outpatient setting, and also be referred for outpatient physical therapy  None of your other previously prescribed medications were changed or altered you should follow-up with your primary care physician within 2 weeks  A work excuse will be provided to you, you would need to contact your primary care physician

## 2025-05-05 NOTE — H&P
"    History of Present Illness  This is a very pleasant 64 y.o. gentleman presenting with neck pain.    Patient is an adequate historian; he is also joined by his wife at the bedside on the 3 N. unit at the time of the interview and examination who helps to corroborate some of the history and physical as well.  Approximately 3 minutes into the interview, CANDIDO Benoit from the neurosurgery service came into the room and participated as well in eliciting some of the information for history.  Patient notes that approximately 10 years prior, he had bilateral arm paresthesias that were diagnosed as cervical radiculopathy and ultimately went through physical therapy course that was over a period of 8 weeks and resolved his symptoms.  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  Given the onset of the pain, he went into a walk-in orthopedic clinic on 5/2/2025 and was given a variety of treatments after plain films were checked, he was prescribed prednisone 50 mg for 5 days which she has been compliant with over the past 2 days; he was also given oxycodone tablets which she has been taking, despite taking these therapies his symptoms seem to be a little worse and he felt like waiting 7 to 10 days to obtain an MRI of his cervical spine was not realistic and decided to come to the emergency department to have further workup and expedited answer.  At the moment, he denies having any " headache, he denies any fevers chills, recent illness, recent travel, denies chest pain, no shortness of breath or cough, no nausea vomiting abdominal pain, he is not having issues with urination, or any other dysuria, no issues with moving his bowels, no peripheral edema.    On arrival to the ED, he was afebrile, heart rate 53, respiratory rate 20, blood pressure was hypertensive at 182/92, SpO2 97% on ambient air; his laboratory assessments showed no leukocytosis or blood count abnormalities, he had a normal metabolic panel; plain film of the cervical spine showed no presence of fracture or listhesis, although reversal of the cervical lordotic curve and moderate to severe degenerative disc disease with ventral osteophytes between the C4 and C7 levels, CT scan of the cervical spine showed no evidence of fracture and advanced degenerative cervical spondylosis with multilevel moderate to severe bilateral foraminal stenosis as well as moderate central canal stenosis at C4/C5 and C5/C6; neurosurgery was consulted from the emergency department and recommended MRI, patient was treated with analgesics, Valium, gabapentin, Toradol and methocarbamol and brought in for further management and workup to the internal medicine service with neurosurgery on in consultation.    A 12 point ROS was elicited from patient and negative except as noted above in the HPI.     Past Medical History  He has no past medical history on file.    Surgical History  He has a past surgical history that includes Other surgical history (09/17/2021); Other surgical history (09/17/2021); Other surgical history (06/15/2022); and Other surgical history (06/15/2022).     Social History  He reports that he has never smoked. He has never used smokeless tobacco. He reports that he does not currently use alcohol. He reports that he does not currently use drugs after having used the following drugs: Marijuana.    Family History  Family History[1]      Allergies  Patient has no known allergies.    Scheduled medications  Scheduled Medications[2]  Continuous medications  Continuous Medications[3]  PRN medications  PRN Medications[4]      Physical Exam  Vitals reviewed.   Constitutional:       General: He is not in acute distress.     Appearance: Normal appearance. He is not ill-appearing.   HENT:      Head: Normocephalic and atraumatic.      Nose: Nose normal.      Mouth/Throat:      Mouth: Mucous membranes are moist.   Eyes:      Conjunctiva/sclera: Conjunctivae normal.   Cardiovascular:      Rate and Rhythm: Normal rate and regular rhythm.      Pulses: Normal pulses.      Heart sounds: Normal heart sounds. No murmur heard.     No friction rub. No gallop.   Pulmonary:      Effort: Pulmonary effort is normal. No respiratory distress.      Breath sounds: Normal breath sounds. No wheezing, rhonchi or rales.   Abdominal:      General: Abdomen is flat. Bowel sounds are normal. There is no distension.      Palpations: Abdomen is soft. There is no mass.      Tenderness: There is no abdominal tenderness. There is no guarding or rebound.   Musculoskeletal:         General: No swelling, tenderness or signs of injury.      Right lower leg: No edema.      Left lower leg: No edema.   Skin:     General: Skin is warm and dry.      Coloration: Skin is not jaundiced or pale.      Findings: No bruising, erythema, lesion or rash.   Neurological:      Mental Status: He is alert and oriented to person, place, and time. Mental status is at baseline.      Motor: No weakness.      Comments: Decreased sensation to light touch in C5-C8 distribution on the right hand compared to the left   Psychiatric:         Mood and Affect: Mood normal.         Behavior: Behavior normal.         Thought Content: Thought content normal.         Judgment: Judgment normal.          Last Recorded Vitals  Vitals:    05/05/25 0721   BP: 158/87   Pulse: (!) 47   Resp: 16   Temp: 36.5 °C (97.7 °F)   SpO2: 96%        Relevant Results  Scheduled medications  Scheduled Medications[5]  Continuous medications  Continuous Medications[6]  PRN medications  PRN Medications[7]     Results for orders placed or performed during the hospital encounter of 05/05/25 (from the past 24 hours)   CBC   Result Value Ref Range    WBC 8.4 4.4 - 11.3 x10*3/uL    nRBC 0.0 0.0 - 0.0 /100 WBCs    RBC 5.67 4.50 - 5.90 x10*6/uL    Hemoglobin 16.7 13.5 - 17.5 g/dL    Hematocrit 50.2 41.0 - 52.0 %    MCV 89 80 - 100 fL    MCH 29.5 26.0 - 34.0 pg    MCHC 33.3 32.0 - 36.0 g/dL    RDW 12.0 11.5 - 14.5 %    Platelets 195 150 - 450 x10*3/uL   Renal Function Panel   Result Value Ref Range    Glucose 105 (H) 74 - 99 mg/dL    Sodium 136 136 - 145 mmol/L    Potassium 3.8 3.5 - 5.3 mmol/L    Chloride 102 98 - 107 mmol/L    Bicarbonate 24 21 - 32 mmol/L    Anion Gap 14 10 - 20 mmol/L    Urea Nitrogen 22 6 - 23 mg/dL    Creatinine 1.00 0.50 - 1.30 mg/dL    eGFR 85 >60 mL/min/1.73m*2    Calcium 9.2 8.6 - 10.3 mg/dL    Phosphorus 4.1 2.5 - 4.9 mg/dL    Albumin 4.4 3.4 - 5.0 g/dL   Protime-INR   Result Value Ref Range    Protime 10.7 9.8 - 12.4 seconds    INR 1.0 0.9 - 1.1        CT cervical spine wo IV contrast  Result Date: 5/5/2025  Interpreted By:  Justin Amador, STUDY: CT CERVICAL SPINE WO IV CONTRAST;  5/5/2025 5:09 am   INDICATION: Signs/Symptoms:Radicular neck pain.  Evaluate for possible occult fracture.   COMPARISON: None.   ACCESSION NUMBER(S): [id number]   ORDERING CLINICIAN: AG CHOE   TECHNIQUE: Axial CT images of the cervical spine are obtained. Axial, coronal and sagittal reconstructions are provided for review.   FINDINGS:     Fractures: There is no evidence for an acute fracture of the cervical spine. Bone mineralization is normal.   Vertebral Alignment: There is reversal of normal cervical lordosis. Mild degenerative retrolisthesis of C4 over C5 and C5 over C6.   Craniocervical Junction: The odontoid process and craniocervical junction are  intact.   Vertebrae/Disc Spaces:  Vertebral body heights are maintained. There is severe degenerative disc space narrowing at C4-C5 and C5-C6 with associated subchondral reactive change, and to lesser extent degenerative space narrowing at C6-C7.   There is also synovial facet arthropathy in the midcervical spine. This contributes to moderate to severe bilateral foraminal stenosis at C3-C4, C4-C5, and C5-C6. Moderate bilateral foraminal stenosis at C6-C7.   There is suspected moderate central canal stenosis at C4-C5 and C5-C6 due to degenerative retrolisthesis and central disc osteophyte complexes.   Prevertebral/Paraspinal Soft Tissues: The prevertebral and paraspinal soft tissues are unremarkable.         No evidence of fracture. Advanced degenerative cervical spondylosis with multilevel moderate to severe bilateral foraminal stenosis as described above. There is also suspected moderate central canal stenosis at C4-C5 and C5-C6. Consider follow-up MRI of cervical spine to better characterize.   Signed by: Justin Amador 5/5/2025 5:18 AM Dictation workstation:   [id number]    XR cervical spine 2-3 views  Result Date: 5/2/2025  Interpreted By:  Budinsky, Cole, STUDY: XR CERVICAL SPINE 2-3 VIEWS; ;  5/2/2025 2:09 pm   INDICATION: Signs/Symptoms:pain.   ACCESSION NUMBER(S): [id number]   ORDERING CLINICIAN: COLE BUDINSKY       Cervical spine films demonstrate no presence for fracture or listhesis. Reversal of the cervical lordotic curve is noted. Moderate to severe degenerative disc disease and ventral osteophytes are present and most noted at the C4-C5, C5-C6, C6-C7 levels.     Signed by: Cole Budinsky 5/2/2025 5:57 PM Dictation workstation:   [id number]    ECG 12 lead (Clinic Performed)  Result Date: 5/1/2025  Sinus bradycardia, rate 50, incomplete right bundle branch block, left ventricular hypertrophy.  Secondary ST-T wave changes.    XR knee left 3 views  Result Date: 4/30/2025  Imaging Result: X-rays  obtained weight-bearing AP x-rays lateral patellar view show patellofemoral DJD left knee with periarticular osteophytosis superior inferior osteophytes seen on the lateral view.  Comparison view right knee shows no pathology no significant femorotibial DJD.      Assessment:  This is a very pleasant 64 y.o. gentleman with a known history of hypertension, hyperlipidemia, sinus bradycardia, and GERD presenting with severe neck pain, imaging congruent with cervical spine degenerative disease and moderate central canal stenosis, will be admitted for further workup and management with neurosurgery on consultation; will treat conservatively with higher dose of gabapentin, continuing prednisone, lidocaine patch, scheduled acetaminophen,  no imminent plan for surgical intervention, patient would be an optimal candidate for physical therapy.    Plan:  Assessment & Plan  Cervical stenosis of spinal canal  Cervical radiculopathy  - Observation, no need for telemetry  -MRI head is ordered and reviewed with neurosurgery as well as with patient and wife  -Neurosurgery is consulted, appreciate their assistance  -Patient has been started on a steroid burst on 5/2, okay to continue  -Continue gabapentin at 300 mg every 8 hours  -Add lidocaine patch to cervical spine  -Scheduled acetaminophen  -Multimodal pain regimen, bowel regimen  Bradycardia  -chronic and stable  Gastroesophageal reflux disease without esophagitis  - chronic and stable, continue PPI  Hyperlipidemia  - chronic and stable, continue statin  Hypertension, uncontrolled  -chronic, resume home losartan    Diet: low sodium  VTE ppx: subcu heparin  Code: FULL    Arben Stanton MD  [facility]    This document was generated in whole or in part using the Dragon One medical voice recognition software and there may be some incorrect words/wording, spelling, or punctuation errors that were not corrected prior to finalization in the medical  record.       [1]   Family History  Problem Relation Name Age of Onset    Diabetes Father      Breast cancer Paternal Grandmother     [2] HYDROmorphone, 0.5 mg, intravenous, Once  lidocaine, 1 patch, transdermal, Once  losartan, 100 mg, oral, Daily  predniSONE, 50 mg, oral, Daily  rosuvastatin, 5 mg, oral, Daily  [3]    [4] PRN medications: acetaminophen, HYDROmorphone, oxyCODONE  [5] HYDROmorphone, 0.5 mg, intravenous, Once  lidocaine, 1 patch, transdermal, Once  losartan, 100 mg, oral, Daily  predniSONE, 50 mg, oral, Daily  rosuvastatin, 5 mg, oral, Daily  [6]    [7] PRN medications: acetaminophen, HYDROmorphone, oxyCODONE

## 2025-05-05 NOTE — CONSULTS
Reason For Consult  Neck pain, cervical stenosis    History Of Present Illness  Kemar Dotson is a 64 y.o. male presenting with history of cervical radiculopathy 10 years prior treated at that time with intense physical therapy.  Currently progressing well however within the past week began experiencing bilateral arm paresthesias from shoulder radiating deltoid through to hands right slightly worse than left.  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  Had visited walk-in Ortho clinic and was given prescription for prednisone and x-rays obtained and recommended for follow-up if symptoms not improving along with working towards getting MRI.  Pain symptoms had continued in severity and he presents through emergency department currently.  History and physical obtained with hospitalist MD present. Patient denies  shortness of breath, chest pain, abdominal pain, bowel or bladder changes.           Past Medical History  He has no past medical history on file.    Surgical History  He has a past surgical history that includes Other surgical history (09/17/2021); Other surgical history (09/17/2021); Other surgical history (06/15/2022); and Other surgical history (06/15/2022).     Social History  He reports that he has never smoked. He has never used smokeless tobacco. He reports that he does not currently use alcohol. He reports that he does not currently use drugs after having used the following drugs: Marijuana.    Family History  Family History[1]     Allergies  Patient has no known allergies.    Review of Systems  14/14 systems reviewed and negative other than what is listed in the history of present illness       Physical Exam  General: Well developed, awake/alert/oriented x3, no distress, alert and cooperative  Skin: Warm and dry, no lesions, no rashes  ENMT: Mucous membranes moist, no apparent  "injury, no lesions seen  Head/Neck: Neck Supple, no apparent injury  Respiratory/Thorax: Normal breath sounds with good chest expansion, thorax symmetric  Cardiovascular: No pitting edema, no JVD    Motor Strength: 5/5 Throughout all extremities    Muscle Bulk: Normal and symmetric in all extremities    Posture:   -- Cervical: Normal  -- Thoracic: Normal  -- Lumbar : Normal  Paraspinal muscle spasm/tenderness cervical spine bilateral  Midline tenderness absent    Sensation: Mild deficit to sensation right deltoid continuing to arm and fingers, otherwise intact to light touch       Last Recorded Vitals  Blood pressure 142/90, pulse (!) 45, temperature 35.7 °C (96.3 °F), temperature source Temporal, resp. rate 16, height 1.626 m (5' 4\"), weight 68.7 kg (151 lb 7.3 oz), SpO2 95%.    Relevant Results  MR cervical spine w and wo IV contrast  Result Date: 5/5/2025  Interpreted By:  Judah Vasquez, STUDY: MR CERVICAL SPINE W AND WO IV CONTRAST; ;  5/5/2025 8:26 am   INDICATION: Signs/Symptoms:intractable neck pain. moderate central canal stenosis on CT, severe foraminal stensosis.     COMPARISON: CT cervical spine from 05/05/2025.   ACCESSION NUMBER(S): [id number]   ORDERING CLINICIAN: QUE DALTON   TECHNIQUE: MRI of the cervical spine was performed with the acquisition of sagittal T2, sagittal T1, sagittal STIR, axial T1, axial T2, sagittal and axial T1 weighted postcontrast sequences.   Contrast: 14 mL of Dotarem was injected intravenously.   FINDINGS: There is reversal of normal cervical lordosis. There is grade 1 anterolisthesis at C2-C3 and mild retrolisthesis at C4-C5 and C5-C6. Vertebral body heights are maintained. There is moderate intervertebral disc height narrowing at C4-C5 and C5-C6 and mild intervertebral disc height narrowing at C6-C7. There are chronic degenerative endplate changes at multiple levels including osteophytic spurring. No striking edematous signal is seen within the visualized osseous " structures. Marrow signal is overall within normal limits. The cervical spinal cord is normal in signal and caliber. There is no mass or abnormal enhancement located within the cervical spinal cord or canal.   At C2-C3, there is no posterior disc contour abnormality. There is no spinal canal stenosis. There is no striking neural foraminal narrowing. There is marked right facet osteoarthropathy.   At C3-C4, there is no striking posterior disc contour abnormality or spinal canal stenosis. There is moderate right and moderate-to-marked left neural foraminal narrowing due to uncovertebral hypertrophy. Narrowing of the left is also related to marked facet osteoarthropathy.   At C4-C5, there is a disc osteophyte complex which along with retrolisthesis causes mild-to-moderate spinal canal stenosis. There is marked right and moderate left neural foraminal narrowing due to uncovertebral hypertrophy. There is no striking facet osteoarthropathy.   At C5-C6, there is a disc osteophyte complex which along with ligamentum flavum buckling/thickening causes marked spinal canal stenosis. There is mild to moderate left and marked right neural foraminal narrowing due to uncovertebral hypertrophy. There is no striking facet osteoarthropathy.   At C6-C7, there is a disc osteophyte complex which along with ligamentum flavum buckling/thickening causes marked spinal canal stenosis. There is moderate-to-marked right and moderate left neural foraminal narrowing due to uncovertebral hypertrophy. There is no striking facet osteoarthropathy.   At C7-T1, there is no posterior disc contour abnormality. There is no spinal canal stenosis or neural foraminal narrowing. There is moderate right facet osteoarthropathy.       Multilevel degenerative changes of the cervical spine including moderate spinal canal stenosis at C4-C5 and marked spinal canal stenosis at C5-C6 and C6-C7. There is mass effect on the cervical spinal cord but there is no cord  signal abnormality. There is variable degree of neural foraminal narrowing at multiple levels as detailed above.   This study was interpreted at [facility].     MACRO: None   Signed by: Judah Vasquez 5/5/2025 8:37 AM Dictation workstation:   [id number]    CT cervical spine wo IV contrast  Result Date: 5/5/2025  Interpreted By:  Justin Amador, STUDY: CT CERVICAL SPINE WO IV CONTRAST;  5/5/2025 5:09 am   INDICATION: Signs/Symptoms:Radicular neck pain.  Evaluate for possible occult fracture.   COMPARISON: None.   ACCESSION NUMBER(S): [id number]   ORDERING CLINICIAN: AG CHOE   TECHNIQUE: Axial CT images of the cervical spine are obtained. Axial, coronal and sagittal reconstructions are provided for review.   FINDINGS:     Fractures: There is no evidence for an acute fracture of the cervical spine. Bone mineralization is normal.   Vertebral Alignment: There is reversal of normal cervical lordosis. Mild degenerative retrolisthesis of C4 over C5 and C5 over C6.   Craniocervical Junction: The odontoid process and craniocervical junction are intact.   Vertebrae/Disc Spaces:  Vertebral body heights are maintained. There is severe degenerative disc space narrowing at C4-C5 and C5-C6 with associated subchondral reactive change, and to lesser extent degenerative space narrowing at C6-C7.   There is also synovial facet arthropathy in the midcervical spine. This contributes to moderate to severe bilateral foraminal stenosis at C3-C4, C4-C5, and C5-C6. Moderate bilateral foraminal stenosis at C6-C7.   There is suspected moderate central canal stenosis at C4-C5 and C5-C6 due to degenerative retrolisthesis and central disc osteophyte complexes.   Prevertebral/Paraspinal Soft Tissues: The prevertebral and paraspinal soft tissues are unremarkable.         No evidence of fracture. Advanced degenerative cervical spondylosis with multilevel moderate to severe bilateral foraminal stenosis as  described above. There is also suspected moderate central canal stenosis at C4-C5 and C5-C6. Consider follow-up MRI of cervical spine to better characterize.   Signed by: Justin Amador 5/5/2025 5:18 AM Dictation workstation:   [id number]         Assessment/Plan     Patient does have significant central and foraminal stenosis most at C4-5, C5-6.  Currently describing radicular paresthesia and pain, minimal numbness and no significant weakness on exam  No hyperreflexia, no Hawkins  No acute neurosurgical intervention planned  Patient states that he would like to attempt rehab if pain symptoms can be under control  Would also recommend outpatient EMG, referral to outpatient pain management for discussion of further nonsurgical treatment options increasing gabapentin to 300 milligrams 3 times daily    Follow-up in office if symptoms continue or improve and then return    Napoleon Ritter PA-C         [1]   Family History  Problem Relation Name Age of Onset    Diabetes Father      Breast cancer Paternal Grandmother

## 2025-05-05 NOTE — ASSESSMENT & PLAN NOTE
- Observation, no need for telemetry  -MRI head is ordered and reviewed with neurosurgery as well as with patient and wife  -Neurosurgery is consulted, appreciate their assistance  -Patient has been started on a steroid burst on 5/2, okay to continue  -Continue gabapentin at 300 mg every 8 hours  -Add lidocaine patch to cervical spine  -Scheduled acetaminophen  -Multimodal pain regimen, bowel regimen

## 2025-05-05 NOTE — DISCHARGE SUMMARY
Discharge Diagnosis  Cervical stenosis of spinal canal    Issues Required Follow-Up  It was a pleasure to meet you in the hospital  You were diagnosed with cervical radiculopathy, a type of irritation of the nerves coming out of the spine  You will not need surgery of your cervical spine to correct this issue after review of the MRI  You were started on a medication called gabapentin as well as a patch of lidocaine and recommended to use scheduled Tylenol at a dose of 1000 mg taken 3 times a day  We also added cyclobenzaprine, a type of muscle relaxant, information on this medication is included in this discharge packet as well  I also spoke to the neurosurgery team, they recommended that you can use a medication called diclofenac twice a day  While you are on the steroid and diclofenac, we have also added a medication called pantoprazole, this is a type of acid reduction medicine  You should plan on taking this medication for 2 weeks  You initially were on steroids for a total of 5 days, we will also give you a taper for an additional 12 days  The neurosurgery team put in an outpatient referral for pain management with Dr. Florez  You will follow-up with neurosurgery in the outpatient setting, and also be referred for outpatient physical therapy  None of your other previously prescribed medications were changed or altered you should follow-up with your primary care physician within 2 weeks  A work excuse will be provided to you, you would need to contact your primary care physician    Test Results Pending At Discharge  Pending Labs       No current pending labs.          Hospital Course  This is a very pleasant 64 y.o. gentleman with a known history of hypertension, hyperlipidemia, sinus bradycardia, and GERD presenting with severe neck pain, imaging congruent with cervical spine degenerative disease and moderate central canal stenosis, will be admitted for further workup and management with neurosurgery on  consultation; will treat conservatively with higher dose of gabapentin, continuing prednisone, lidocaine patch, scheduled acetaminophen, although pain was still not optimized, ended up uptitrating gabapentin, adding cyclobenzaprine as needed as well as scheduled diclofenac and added PPI while on the diclofenac, will need extension of steroids; he was given the above-mentioned instructions, discharge plan was discussed with the patient and his wife at the bedside at the time of being medically cleared; he will follow-up with PCP and neurosurgery and was given a referral to pain management as well with plan for outpatient PT; no imminent plan for surgical intervention, patient would be an optimal candidate for physical therapy.     Pertinent Physical Exam At Time of Discharge  Vitals reviewed.   Constitutional:       General: He is not in acute distress.     Appearance: Normal appearance. He is not ill-appearing.   HENT:      Head: Normocephalic and atraumatic.      Nose: Nose normal.      Mouth/Throat:      Mouth: Mucous membranes are moist.   Eyes:      Conjunctiva/sclera: Conjunctivae normal.   Cardiovascular:      Rate and Rhythm: Normal rate and regular rhythm.      Pulses: Normal pulses.      Heart sounds: Normal heart sounds. No murmur heard.     No friction rub. No gallop.   Pulmonary:      Effort: Pulmonary effort is normal. No respiratory distress.      Breath sounds: Normal breath sounds. No wheezing, rhonchi or rales.   Abdominal:      General: Abdomen is flat. Bowel sounds are normal. There is no distension.      Palpations: Abdomen is soft. There is no mass.      Tenderness: There is no abdominal tenderness. There is no guarding or rebound.   Musculoskeletal:         General: No swelling, tenderness or signs of injury.      Right lower leg: No edema.      Left lower leg: No edema.   Skin:     General: Skin is warm and dry.      Coloration: Skin is not jaundiced or pale.      Findings: No bruising,  erythema, lesion or rash.   Neurological:      Mental Status: He is alert and oriented to person, place, and time. Mental status is at baseline.      Motor: No weakness.      Comments: Decreased sensation to light touch in C5-C8 distribution on the right hand compared to the left   Psychiatric:         Mood and Affect: Mood normal.         Behavior: Behavior normal.         Thought Content: Thought content normal.         Judgment: Judgment normal.     Home Medications     Medication List      CONTINUE taking these medications     blood pressure monitor kit; Commonly known as: Blood Pressure Kit; 1 kit   once daily as needed (check daily at different times of day).     ASK your doctor about these medications     losartan 100 mg tablet; Commonly known as: Cozaar; Take 1 tablet (100   mg) by mouth once daily.   oxyCODONE-acetaminophen 5-325 mg tablet; Commonly known as: Percocet;   Take 1 tablet by mouth every 12 hours if needed for severe pain (7 - 10)   for up to 7 days.   predniSONE 50 mg tablet; Commonly known as: Deltasone; Take 1 tablet (50   mg) by mouth once daily for 5 days.   rosuvastatin 5 mg tablet; Commonly known as: Crestor; Take 1 tablet (5   mg) by mouth once daily.   tadalafil 5 mg tablet; Commonly known as: Cialis; Take 1 tablet (5 mg)   by mouth once daily.   triamcinolone 0.1 % cream; Commonly known as: Kenalog; Apply topically 2   times a day.       Outpatient Follow-Up  Future Appointments   Date Time Provider Department Center   5/16/2025  9:30 AM ELY [id number] CT 1 [id number] [facility]   5/29/2025  9:15 AM Truman Aguilera MD [id number] [place]   7/7/2025 10:30 AM Abhilash Tellez MD [id number] [place]   7/15/2025  9:30 AM Kevin Prather DO [id number] None   4/10/2026 10:45 AM Mariella Carbone MD [id number] [place]       Greater than 30 minutes was spent facilitating this patients discharge from the hospital which included examining the patient, reconciling medications, and making arrangements  for future care.    Arben Stanton MD  [facility]  [place]    This document was generated in whole or in part using the Dragon One medical voice recognition software and there may be some incorrect words/wording, spelling, or punctuation errors that were not corrected prior to finalization in the medical record.

## 2025-05-05 NOTE — SIGNIFICANT EVENT
To Whom It May Concern:     Jericho Dotson has been under our care at [facility].  Jericho is permitted to return to work on 5/12/25 without restrictions.     Sincerely,        Arben Stanton M.D.  Internal / Hospital Medicine    [facility] [place]  [id number] [place], [place] [address]  Tel: [phone]  Fax: [phone]

## 2025-05-06 ENCOUNTER — APPOINTMENT (OUTPATIENT)
Dept: CARDIOLOGY | Facility: CLINIC | Age: 65
End: 2025-05-06
Payer: COMMERCIAL

## 2025-05-06 LAB
ANION GAP SERPL CALC-SCNC: 11 MMOL/L (ref 10–20)
BUN SERPL-MCNC: 23 MG/DL (ref 6–23)
CALCIUM SERPL-MCNC: 9.1 MG/DL (ref 8.6–10.3)
CHLORIDE SERPL-SCNC: 102 MMOL/L (ref 98–107)
CO2 SERPL-SCNC: 26 MMOL/L (ref 21–32)
CREAT SERPL-MCNC: 0.89 MG/DL (ref 0.5–1.3)
EGFRCR SERPLBLD CKD-EPI 2021: >90 ML/MIN/1.73M*2
ERYTHROCYTE [DISTWIDTH] IN BLOOD BY AUTOMATED COUNT: 12 % (ref 11.5–14.5)
GLUCOSE SERPL-MCNC: 108 MG/DL (ref 74–99)
HCT VFR BLD AUTO: 46 % (ref 41–52)
HGB BLD-MCNC: 15.8 G/DL (ref 13.5–17.5)
MAGNESIUM SERPL-MCNC: 2.27 MG/DL (ref 1.6–2.4)
MCH RBC QN AUTO: 29.4 PG (ref 26–34)
MCHC RBC AUTO-ENTMCNC: 34.3 G/DL (ref 32–36)
MCV RBC AUTO: 86 FL (ref 80–100)
NRBC BLD-RTO: 0 /100 WBCS (ref 0–0)
PLATELET # BLD AUTO: 194 X10*3/UL (ref 150–450)
POTASSIUM SERPL-SCNC: 4.2 MMOL/L (ref 3.5–5.3)
RBC # BLD AUTO: 5.38 X10*6/UL (ref 4.5–5.9)
SODIUM SERPL-SCNC: 135 MMOL/L (ref 136–145)
WBC # BLD AUTO: 8.8 X10*3/UL (ref 4.4–11.3)

## 2025-05-06 PROCEDURE — 85027 COMPLETE CBC AUTOMATED: CPT

## 2025-05-06 PROCEDURE — 97140 MANUAL THERAPY 1/> REGIONS: CPT | Mod: GP | Performed by: PHYSICAL THERAPIST

## 2025-05-06 PROCEDURE — 80048 BASIC METABOLIC PNL TOTAL CA: CPT

## 2025-05-06 PROCEDURE — 2500000002 HC RX 250 W HCPCS SELF ADMINISTERED DRUGS (ALT 637 FOR MEDICARE OP, ALT 636 FOR OP/ED): Performed by: INTERNAL MEDICINE

## 2025-05-06 PROCEDURE — 36415 COLL VENOUS BLD VENIPUNCTURE: CPT

## 2025-05-06 PROCEDURE — 99232 SBSQ HOSP IP/OBS MODERATE 35: CPT | Performed by: INTERNAL MEDICINE

## 2025-05-06 PROCEDURE — 1100000001 HC PRIVATE ROOM DAILY

## 2025-05-06 PROCEDURE — 2500000004 HC RX 250 GENERAL PHARMACY W/ HCPCS (ALT 636 FOR OP/ED)

## 2025-05-06 PROCEDURE — 2500000004 HC RX 250 GENERAL PHARMACY W/ HCPCS (ALT 636 FOR OP/ED): Mod: JZ | Performed by: INTERNAL MEDICINE

## 2025-05-06 PROCEDURE — 99232 SBSQ HOSP IP/OBS MODERATE 35: CPT | Performed by: PHYSICIAN ASSISTANT

## 2025-05-06 PROCEDURE — 97161 PT EVAL LOW COMPLEX 20 MIN: CPT | Mod: GP | Performed by: PHYSICAL THERAPIST

## 2025-05-06 PROCEDURE — 2500000005 HC RX 250 GENERAL PHARMACY W/O HCPCS: Performed by: INTERNAL MEDICINE

## 2025-05-06 PROCEDURE — 83735 ASSAY OF MAGNESIUM: CPT

## 2025-05-06 PROCEDURE — 2500000001 HC RX 250 WO HCPCS SELF ADMINISTERED DRUGS (ALT 637 FOR MEDICARE OP): Performed by: INTERNAL MEDICINE

## 2025-05-06 RX ORDER — OXYCODONE HYDROCHLORIDE 5 MG/1
5 TABLET ORAL ONCE
Refills: 0 | Status: COMPLETED | OUTPATIENT
Start: 2025-05-06 | End: 2025-05-06

## 2025-05-06 RX ORDER — OXYCODONE HYDROCHLORIDE 5 MG/1
7.5 TABLET ORAL EVERY 4 HOURS PRN
Refills: 0 | Status: DISCONTINUED | OUTPATIENT
Start: 2025-05-06 | End: 2025-05-07 | Stop reason: HOSPADM

## 2025-05-06 RX ORDER — CYCLOBENZAPRINE HCL 5 MG
5 TABLET ORAL 3 TIMES DAILY PRN
Status: DISCONTINUED | OUTPATIENT
Start: 2025-05-06 | End: 2025-05-07 | Stop reason: HOSPADM

## 2025-05-06 RX ORDER — GABAPENTIN 300 MG/1
600 CAPSULE ORAL EVERY 8 HOURS SCHEDULED
Status: DISCONTINUED | OUTPATIENT
Start: 2025-05-06 | End: 2025-05-07 | Stop reason: HOSPADM

## 2025-05-06 RX ADMIN — GABAPENTIN 600 MG: 300 CAPSULE ORAL at 21:05

## 2025-05-06 RX ADMIN — ROSUVASTATIN CALCIUM 5 MG: 5 TABLET, FILM COATED ORAL at 09:21

## 2025-05-06 RX ADMIN — GABAPENTIN 300 MG: 300 CAPSULE ORAL at 04:18

## 2025-05-06 RX ADMIN — LOSARTAN POTASSIUM 100 MG: 100 TABLET, FILM COATED ORAL at 09:21

## 2025-05-06 RX ADMIN — HEPARIN SODIUM 5000 UNITS: 5000 INJECTION, SOLUTION INTRAVENOUS; SUBCUTANEOUS at 21:05

## 2025-05-06 RX ADMIN — LIDOCAINE 4% 1 PATCH: 40 PATCH TOPICAL at 09:22

## 2025-05-06 RX ADMIN — KETOROLAC TROMETHAMINE 30 MG: 30 INJECTION, SOLUTION INTRAMUSCULAR at 09:21

## 2025-05-06 RX ADMIN — OXYCODONE HYDROCHLORIDE 7.5 MG: 5 TABLET ORAL at 21:05

## 2025-05-06 RX ADMIN — HEPARIN SODIUM 5000 UNITS: 5000 INJECTION, SOLUTION INTRAVENOUS; SUBCUTANEOUS at 04:18

## 2025-05-06 RX ADMIN — HEPARIN SODIUM 5000 UNITS: 5000 INJECTION, SOLUTION INTRAVENOUS; SUBCUTANEOUS at 11:36

## 2025-05-06 RX ADMIN — CYCLOBENZAPRINE HYDROCHLORIDE 5 MG: 5 TABLET, FILM COATED ORAL at 11:40

## 2025-05-06 RX ADMIN — OXYCODONE HYDROCHLORIDE 7.5 MG: 5 TABLET ORAL at 11:36

## 2025-05-06 RX ADMIN — CYCLOBENZAPRINE HYDROCHLORIDE 5 MG: 5 TABLET, FILM COATED ORAL at 21:05

## 2025-05-06 RX ADMIN — OXYCODONE HYDROCHLORIDE 5 MG: 5 TABLET ORAL at 09:47

## 2025-05-06 RX ADMIN — OXYCODONE HYDROCHLORIDE 5 MG: 5 TABLET ORAL at 06:12

## 2025-05-06 RX ADMIN — GABAPENTIN 600 MG: 300 CAPSULE ORAL at 11:36

## 2025-05-06 RX ADMIN — KETOROLAC TROMETHAMINE 30 MG: 30 INJECTION, SOLUTION INTRAMUSCULAR at 04:18

## 2025-05-06 RX ADMIN — PREDNISONE 50 MG: 50 TABLET ORAL at 09:21

## 2025-05-06 RX ADMIN — OXYCODONE HYDROCHLORIDE 5 MG: 5 TABLET ORAL at 00:21

## 2025-05-06 RX ADMIN — SENNOSIDES AND DOCUSATE SODIUM 1 TABLET: 50; 8.6 TABLET ORAL at 21:05

## 2025-05-06 ASSESSMENT — PAIN - FUNCTIONAL ASSESSMENT
PAIN_FUNCTIONAL_ASSESSMENT: 0-10

## 2025-05-06 ASSESSMENT — COGNITIVE AND FUNCTIONAL STATUS - GENERAL
MOBILITY SCORE: 24
DAILY ACTIVITIY SCORE: 24
DAILY ACTIVITIY SCORE: 24
MOBILITY SCORE: 24
MOBILITY SCORE: 24

## 2025-05-06 ASSESSMENT — PAIN SCALES - GENERAL
PAINLEVEL_OUTOF10: 6
PAINLEVEL_OUTOF10: 8
PAINLEVEL_OUTOF10: 6
PAINLEVEL_OUTOF10: 7
PAINLEVEL_OUTOF10: 8
PAINLEVEL_OUTOF10: 0 - NO PAIN
PAINLEVEL_OUTOF10: 9
PAINLEVEL_OUTOF10: 9
PAINLEVEL_OUTOF10: 5 - MODERATE PAIN
PAINLEVEL_OUTOF10: 10 - WORST POSSIBLE PAIN
PAINLEVEL_OUTOF10: 7
PAINLEVEL_OUTOF10: 6
PAINLEVEL_OUTOF10: 5 - MODERATE PAIN
PAINLEVEL_OUTOF10: 6
PAINLEVEL_OUTOF10: 9

## 2025-05-06 ASSESSMENT — PAIN DESCRIPTION - DESCRIPTORS
DESCRIPTORS: ACHING;SHOOTING
DESCRIPTORS: ACHING
DESCRIPTORS: ACHING;SHOOTING
DESCRIPTORS: ACHING;SHOOTING

## 2025-05-06 ASSESSMENT — PAIN DESCRIPTION - ORIENTATION
ORIENTATION: RIGHT

## 2025-05-06 ASSESSMENT — PAIN DESCRIPTION - LOCATION
LOCATION: NECK
LOCATION: NECK
LOCATION: ARM
LOCATION: ARM
LOCATION: NECK

## 2025-05-06 ASSESSMENT — ACTIVITIES OF DAILY LIVING (ADL): LACK_OF_TRANSPORTATION: NO

## 2025-05-06 NOTE — CARE PLAN
Problem: Pain - Adult  Goal: Verbalizes/displays adequate comfort level or baseline comfort level  Outcome: Progressing     Problem: Discharge Planning  Goal: Discharge to home or other facility with appropriate resources  Outcome: Progressing     Problem: Chronic Conditions and Co-morbidities  Goal: Patient's chronic conditions and co-morbidity symptoms are monitored and maintained or improved  Outcome: Progressing     Problem: Pain  Goal: Takes deep breaths with improved pain control throughout the shift  Outcome: Progressing  Goal: Turns in bed with improved pain control throughout the shift  Outcome: Progressing  Goal: Walks with improved pain control throughout the shift  Outcome: Progressing  Goal: Performs ADL's with improved pain control throughout shift  Outcome: Progressing  Goal: Participates in PT with improved pain control throughout the shift  Outcome: Progressing  Goal: Free from opioid side effects throughout the shift  Outcome: Progressing       The clinical goals for the shift include Pt will remain HDS throughout the shift

## 2025-05-06 NOTE — PROGRESS NOTES
05/06/25 1523   Discharge Planning   Living Arrangements Spouse/significant other   Support Systems Spouse/significant other   Assistance Needed none   Type of Residence Private residence   Number of Stairs to Enter Residence 2   Number of Stairs Within Residence 15   Home or Post Acute Services None   Expected Discharge Disposition Home   Does the patient need discharge transport arranged? No   Financial Resource Strain   How hard is it for you to pay for the very basics like food, housing, medical care, and heating? Not hard   Housing Stability   In the last 12 months, was there a time when you were not able to pay the mortgage or rent on time? N   At any time in the past 12 months, were you homeless or living in a shelter (including now)? N   Transportation Needs   In the past 12 months, has lack of transportation kept you from medical appointments or from getting medications? no   In the past 12 months, has lack of transportation kept you from meetings, work, or from getting things needed for daily living? No   Intensity of Service   Intensity of Service 0-30 min     Spoke to patient and wife at bedside to explain my role in discharge planning. Patient states he lives at home with his wife. Patient states he has not been able to get around since his injury on Friday. He states he has not been able to work with therapy here yet because his pain is not under control. He states he would like to be able to work with therapy, but the pain prevents it. He states his plan is to return home when medically ready and go to outpatient therapy if needed. CT team to follow for therapy recommendations.

## 2025-05-06 NOTE — ASSESSMENT & PLAN NOTE
- Observation, no need for telemetry  - MRI cervical spine reviewed again with patient and wife  - Neurosurgery is consulted, appreciate their assistance  - Continue with steroid burst, may slightly extend  - Continue gabapentin, uptitrate to 600 mg every 8 hours  - Cotninue lidocaine patch to cervical spine and scheduled acetaminophen  - Multimodal pain regimen, bowel regimen

## 2025-05-06 NOTE — CARE PLAN
EOS: Pt. Stable throughout this shift pt c/o pain to neck and R arm this shift treated with PRN medications with pt reporting relief and able to rest intermittently. Pt. Was able to participate in therapies this shift. Pt. Was able to ambulate independently throughout this shift. Pt. Family remained at bedside throughout this shift and updated on pt. Status and plan. Pt currently resting in bed at this time. Call light within reach. Bed alarm on.

## 2025-05-06 NOTE — PROGRESS NOTES
"Physical Therapy    Physical Therapy Evaluation    Patient Name: Jericho Dotson \"Joie"  MRN: [id number]  Today's Date: 5/6/2025   Time Calculation  Start Time: 1545  Stop Time: 1619  Time Calculation (min): 34 min  3011/3011-A    Assessment/Plan   PT Assessment  PT Assessment Results: Pain  Rehab Prognosis: Good  Evaluation/Treatment Tolerance: Patient tolerated treatment well  Medical Staff Made Aware: Yes  End of Session Communication: Bedside nurse  Assessment Comment: Pt is a 64 y.o. male admitted for Cervical stenosis of spinal canal [M48.02] on 5/5/2025. Pt below functional level and will benefit from skilled therapy during stay to improve overall functional mobility, strength, ROM, endurance and safety awareness. Upon discharge pt will benefit from outpatient therapy for continued improvement in functional mobility. Therapy will continue to follow and reassess each session.      End of Session Patient Position: Up in chair  IP OR SWING BED PT PLAN  Inpatient or Swing Bed: Inpatient  PT Plan  Treatment/Interventions: Strengthening, Therapeutic exercise  PT Plan: PT Eval only  PT Frequency: PT eval only  PT Discharge Recommendations:  (outpatient PT)  PT Recommended Transfer Status: Independent  PT - OK to Discharge: Yes    Subjective     Current Problem:  1. Cervical stenosis of spinal canal          Problem List[1]    General Visit Information:  General  Reason for Referral: impaired mobility, neck pain.  Referred By: Dr. Stanton  Prior to Session Communication: Bedside nurse  Patient Position Received: Bed, 3 rail up  Preferred Learning Style: kinesthetic, verbal  General Comment: Pt agreeable to therapy    Home Living:  Home Living  Type of Home: House  Lives With: Spouse    Prior Level of Function:  Prior Function Per Pt/Caregiver Report  Level of [place]: Independent with ADLs and functional transfers, Independent with homemaking with ambulation    Vital Signs:     Objective     Pain:  Pain " Assessment  Pain Assessment: 0-10  0-10 (Numeric) Pain Score: 8  Pain Type: Acute pain  Pain Location: Neck  Pain Descriptors: Aching    Cognition:  Cognition  Overall Cognitive Status: Within Functional Limits  Orientation Level: Oriented X4    General Assessments:      Static Sitting Balance  Static Sitting-Comment/Number of Minutes: good  Dynamic Sitting Balance  Dynamic Sitting-Comments: good  Static Standing Balance  Static Standing-Comment/Number of Minutes: good  Dynamic Standing Balance  Dynamic Standing-Comments: good    Functional Assessments:     Bed Mobility  Bed Mobility: Yes  Bed Mobility 1  Bed Mobility 1: Supine to sitting  Level of Assistance 1: Independent  Transfers  Transfer: Yes  Transfer 1  Technique 1: Sit to stand, Stand to sit  Transfer Level of Assistance 1: Independent  Ambulation/Gait Training  Ambulation/Gait Training Performed: Yes  Ambulation/Gait Training 1  Device 1: No device  Assistance 1: Independent  Comments/Distance (ft) 1: 50          Extremity/Trunk Assessments:  RUE   RUE : Within Functional Limits  LUE   LUE: Within Functional Limits  RLE   RLE : Within Functional Limits  LLE   LLE : Within Functional Limits    Manual Therapy technique to upper traps, levator scapulae, rhomboids deep tissue with improvement in pain and ROM.     Pt educated and instructed in chin tucks, T4 mobs with UE overhead mobility and neck extension x 15 reps each. Pt encouraged to perform neck ROM exercises.     Outcome Measures:     [facility] Basic Mobility  Turning from your back to your side while in a flat bed without using bedrails: None  Moving from lying on your back to sitting on the side of a flat bed without using bedrails: None  Moving to and from bed to chair (including a wheelchair): None  Standing up from a chair using your arms (e.g. wheelchair or bedside chair): None  To walk in hospital room: None  Climbing 3-5 steps with railing: None  Basic Mobility - Total Score: 24             [1]    Patient Active Problem List  Diagnosis    Hypertension, uncontrolled    Hyperlipidemia    Chest pain, atypical    Arthritis of hand, left    Ectatic thoracic aorta (CMS-HCC)    At increased risk for cardiovascular disease    Osteoarthritis    Cervical radiculopathy    Palpitations    Snores    Fatigue    Status post appendectomy    Abnormal EKG    Bradycardia    Gastroesophageal reflux disease without esophagitis    Cervical stenosis of spinal canal

## 2025-05-06 NOTE — PROGRESS NOTES
Patient Name: Jericho Dotson   YOB: 1960    Subjective  Was laying in bed rolling around, and moaning in discomfort upon initial entering of the room; his wife was sitting in the recliner at the bedside.  He says that he is having acutely worsened pain, although as we get deeper into conversation, he was able to lay in bed and appear comfortable and converse without being in excruciating pain, and was able to speak intelligibly and comfortably.  He said that he is still getting some flares of pain that are going down his arm, he is otherwise without fever or chills, he has been tolerating a p.o. diet.  He continues to ask many questions regarding his ability to perform his job, and is asking about how much time he might be able to get from work; we discussed that disability paperwork would need to be filled out by a long-term physician that follows him, he is understanding of this, and appreciates that the hospital would be able to give him a work excuse for the time he has been in the hospital.  He does not feel like he would be comfortable to go home feeling like he is, he is open to further medication titration and to work with physical therapy today.     Objective  Vitals:    05/06/25 1200   BP: 164/88   Pulse: 60   Resp: 18   Temp: 36.4 °C (97.5 °F)   SpO2: 96%       Physical Exam   Vitals reviewed.   Constitutional:       General: He is not in acute distress.     Appearance: Normal appearance. He is not ill-appearing.   HENT:      Head: Normocephalic and atraumatic.      Nose: Nose normal.      Mouth/Throat:      Mouth: Mucous membranes are moist.   Eyes:      Conjunctiva/sclera: Conjunctivae normal.   Cardiovascular:      Rate and Rhythm: Normal rate and regular rhythm.      Pulses: Normal pulses.      Heart sounds: Normal heart sounds. No murmur heard.     No friction rub. No gallop.   Pulmonary:      Effort: Pulmonary effort is normal. No  respiratory distress.      Breath sounds: Normal breath sounds. No wheezing, rhonchi or rales.   Abdominal:      General: Abdomen is flat. Bowel sounds are normal. There is no distension.      Palpations: Abdomen is soft. There is no mass.      Tenderness: There is no abdominal tenderness. There is no guarding or rebound.   Musculoskeletal:         General: No swelling, tenderness or signs of injury.      Right lower leg: No edema.      Left lower leg: No edema.   Skin:     General: Skin is warm and dry.      Coloration: Skin is not jaundiced or pale.      Findings: No bruising, erythema, lesion or rash.   Neurological:      Mental Status: He is alert and oriented to person, place, and time. Mental status is at baseline.      Motor: No weakness.      Comments: Decreased sensation to light touch in C5-C8 distribution on the right hand compared to the left   Psychiatric:         Mood and Affect: Mood normal.         Behavior: Behavior normal.         Thought Content: Thought content normal.         Judgment: Judgment normal.     Scheduled medications  Scheduled Medications[1]  Continuous medications  Continuous Medications[2]  PRN medications  PRN Medications[3]     Results for orders placed or performed during the hospital encounter of 05/05/25 (from the past 24 hours)   Basic metabolic panel   Result Value Ref Range    Glucose 108 (H) 74 - 99 mg/dL    Sodium 135 (L) 136 - 145 mmol/L    Potassium 4.2 3.5 - 5.3 mmol/L    Chloride 102 98 - 107 mmol/L    Bicarbonate 26 21 - 32 mmol/L    Anion Gap 11 10 - 20 mmol/L    Urea Nitrogen 23 6 - 23 mg/dL    Creatinine 0.89 0.50 - 1.30 mg/dL    eGFR >90 >60 mL/min/1.73m*2    Calcium 9.1 8.6 - 10.3 mg/dL   CBC   Result Value Ref Range    WBC 8.8 4.4 - 11.3 x10*3/uL    nRBC 0.0 0.0 - 0.0 /100 WBCs    RBC 5.38 4.50 - 5.90 x10*6/uL    Hemoglobin 15.8 13.5 - 17.5 g/dL    Hematocrit 46.0 41.0 - 52.0 %    MCV 86 80 - 100 fL    MCH 29.4 26.0 - 34.0 pg    MCHC 34.3 32.0 - 36.0 g/dL     RDW 12.0 11.5 - 14.5 %    Platelets 194 150 - 450 x10*3/uL   Magnesium   Result Value Ref Range    Magnesium 2.27 1.60 - 2.40 mg/dL       Assessment  This is a very pleasant 64 y.o. gentleman with a known history of hypertension, hyperlipidemia, sinus bradycardia, and GERD presenting with severe neck pain, imaging congruent with cervical spine degenerative disease and moderate central canal stenosis, will be admitted for further workup and management with neurosurgery on consultation; will treat conservatively with higher dose of gabapentin, going to 600mg TID, adding flexeril, continuing prednisone, lidocaine patch, scheduled acetaminophen, no imminent plan for surgical intervention, patient would be an optimal candidate for physical therapy, plan to keep in hospital while titrating medications.    Plan  Assessment & Plan  Cervical stenosis of spinal canal  Cervical radiculopathy  - Observation, no need for telemetry  - MRI cervical spine reviewed again with patient and wife  - Neurosurgery is consulted, appreciate their assistance  - Continue with steroid burst, may slightly extend  - Continue gabapentin, uptitrate to 600 mg every 8 hours  - Cotninue lidocaine patch to cervical spine and scheduled acetaminophen  - Multimodal pain regimen, bowel regimen  Bradycardia  - chronic and stable  Gastroesophageal reflux disease without esophagitis  - chronic and stable, continue PPI  Hyperlipidemia  - chronic and stable, continue statin  Hypertension, uncontrolled  - chronic, continue home losartan    Diet: low sodium  VTE ppx: subcu heparin  Code: FULL    I spent 35 minutes in the professional and overall care of this patient. More than 50% of this time was spent examining and counseling patient, reviewing plan of care, and coordinating medical care.    Arben Stanton MD  [facility] Medicine    This document was generated in whole or in part using the Dragon One medical voice recognition software  and there may be some incorrect words/wording, spelling, or punctuation errors that were not corrected prior to finalization in the medical record.        [1] gabapentin, 600 mg, oral, q8h ANDRES  heparin (porcine), 5,000 Units, subcutaneous, q8h ANDRES  lidocaine, 1 patch, transdermal, Daily  losartan, 100 mg, oral, Daily  polyethylene glycol, 17 g, oral, Daily  predniSONE, 50 mg, oral, Daily  rosuvastatin, 5 mg, oral, Daily  sennosides-docusate sodium, 1 tablet, oral, BID  [2]    [3] PRN medications: acetaminophen, cyclobenzaprine, ketorolac, oxyCODONE

## 2025-05-07 ENCOUNTER — APPOINTMENT (OUTPATIENT)
Dept: CARDIOLOGY | Facility: HOSPITAL | Age: 65
End: 2025-05-07
Payer: COMMERCIAL

## 2025-05-07 ENCOUNTER — PHARMACY VISIT (OUTPATIENT)
Dept: PHARMACY | Facility: CLINIC | Age: 65
End: 2025-05-07
Payer: MEDICARE

## 2025-05-07 VITALS
HEIGHT: 64 IN | OXYGEN SATURATION: 97 % | TEMPERATURE: 97.3 F | SYSTOLIC BLOOD PRESSURE: 144 MMHG | HEART RATE: 56 BPM | BODY MASS INDEX: 25.86 KG/M2 | WEIGHT: 151.46 LBS | DIASTOLIC BLOOD PRESSURE: 85 MMHG | RESPIRATION RATE: 16 BRPM

## 2025-05-07 LAB
ANION GAP SERPL CALC-SCNC: 10 MMOL/L (ref 10–20)
BUN SERPL-MCNC: 25 MG/DL (ref 6–23)
CALCIUM SERPL-MCNC: 9.1 MG/DL (ref 8.6–10.3)
CHLORIDE SERPL-SCNC: 105 MMOL/L (ref 98–107)
CO2 SERPL-SCNC: 28 MMOL/L (ref 21–32)
CREAT SERPL-MCNC: 0.99 MG/DL (ref 0.5–1.3)
EGFRCR SERPLBLD CKD-EPI 2021: 86 ML/MIN/1.73M*2
ERYTHROCYTE [DISTWIDTH] IN BLOOD BY AUTOMATED COUNT: 12 % (ref 11.5–14.5)
GLUCOSE SERPL-MCNC: 95 MG/DL (ref 74–99)
HCT VFR BLD AUTO: 46.3 % (ref 41–52)
HGB BLD-MCNC: 15.8 G/DL (ref 13.5–17.5)
MAGNESIUM SERPL-MCNC: 2.23 MG/DL (ref 1.6–2.4)
MCH RBC QN AUTO: 29.3 PG (ref 26–34)
MCHC RBC AUTO-ENTMCNC: 34.1 G/DL (ref 32–36)
MCV RBC AUTO: 86 FL (ref 80–100)
NRBC BLD-RTO: 0 /100 WBCS (ref 0–0)
PLATELET # BLD AUTO: 201 X10*3/UL (ref 150–450)
POTASSIUM SERPL-SCNC: 4.2 MMOL/L (ref 3.5–5.3)
RBC # BLD AUTO: 5.4 X10*6/UL (ref 4.5–5.9)
SODIUM SERPL-SCNC: 139 MMOL/L (ref 136–145)
WBC # BLD AUTO: 7.8 X10*3/UL (ref 4.4–11.3)

## 2025-05-07 PROCEDURE — RXMED WILLOW AMBULATORY MEDICATION CHARGE

## 2025-05-07 PROCEDURE — 2500000004 HC RX 250 GENERAL PHARMACY W/ HCPCS (ALT 636 FOR OP/ED)

## 2025-05-07 PROCEDURE — 80048 BASIC METABOLIC PNL TOTAL CA: CPT

## 2025-05-07 PROCEDURE — 2500000005 HC RX 250 GENERAL PHARMACY W/O HCPCS: Performed by: INTERNAL MEDICINE

## 2025-05-07 PROCEDURE — 85027 COMPLETE CBC AUTOMATED: CPT

## 2025-05-07 PROCEDURE — 83735 ASSAY OF MAGNESIUM: CPT

## 2025-05-07 PROCEDURE — 36415 COLL VENOUS BLD VENIPUNCTURE: CPT

## 2025-05-07 PROCEDURE — 2500000001 HC RX 250 WO HCPCS SELF ADMINISTERED DRUGS (ALT 637 FOR MEDICARE OP): Performed by: INTERNAL MEDICINE

## 2025-05-07 PROCEDURE — 2500000004 HC RX 250 GENERAL PHARMACY W/ HCPCS (ALT 636 FOR OP/ED): Performed by: INTERNAL MEDICINE

## 2025-05-07 PROCEDURE — 2500000002 HC RX 250 W HCPCS SELF ADMINISTERED DRUGS (ALT 637 FOR MEDICARE OP, ALT 636 FOR OP/ED): Performed by: INTERNAL MEDICINE

## 2025-05-07 RX ORDER — CYCLOBENZAPRINE HCL 5 MG
5 TABLET ORAL 3 TIMES DAILY PRN
Qty: 30 TABLET | Refills: 0 | Status: SHIPPED | OUTPATIENT
Start: 2025-05-07 | End: 2025-05-21

## 2025-05-07 RX ORDER — DICLOFENAC SODIUM 75 MG/1
75 TABLET, DELAYED RELEASE ORAL 2 TIMES DAILY
Qty: 60 TABLET | Refills: 0 | Status: SHIPPED | OUTPATIENT
Start: 2025-05-07 | End: 2025-06-06

## 2025-05-07 RX ORDER — LIDOCAINE 560 MG/1
1 PATCH PERCUTANEOUS; TOPICAL; TRANSDERMAL DAILY
Start: 2025-05-07 | End: 2025-05-09 | Stop reason: SDUPTHER

## 2025-05-07 RX ORDER — ACETAMINOPHEN 325 MG/1
975 TABLET ORAL EVERY 8 HOURS
Start: 2025-05-07

## 2025-05-07 RX ORDER — PANTOPRAZOLE SODIUM 40 MG/1
40 TABLET, DELAYED RELEASE ORAL
Status: DISCONTINUED | OUTPATIENT
Start: 2025-05-07 | End: 2025-05-07 | Stop reason: HOSPADM

## 2025-05-07 RX ORDER — PANTOPRAZOLE SODIUM 40 MG/1
40 TABLET, DELAYED RELEASE ORAL
Qty: 30 TABLET | Refills: 0 | Status: SHIPPED | OUTPATIENT
Start: 2025-05-08 | End: 2025-06-07

## 2025-05-07 RX ORDER — OXYCODONE HYDROCHLORIDE 5 MG/1
5 TABLET ORAL EVERY 6 HOURS PRN
Qty: 15 TABLET | Refills: 0 | Status: SHIPPED | OUTPATIENT
Start: 2025-05-07 | End: 2025-05-11

## 2025-05-07 RX ORDER — DICLOFENAC SODIUM 75 MG/1
75 TABLET, DELAYED RELEASE ORAL 2 TIMES DAILY
Status: DISCONTINUED | OUTPATIENT
Start: 2025-05-07 | End: 2025-05-07 | Stop reason: HOSPADM

## 2025-05-07 RX ORDER — GABAPENTIN 300 MG/1
600 CAPSULE ORAL EVERY 8 HOURS SCHEDULED
Qty: 180 CAPSULE | Refills: 0 | Status: SHIPPED | OUTPATIENT
Start: 2025-05-07 | End: 2025-06-06

## 2025-05-07 RX ORDER — PREDNISONE 10 MG/1
TABLET ORAL
Qty: 30 TABLET | Refills: 0 | Status: SHIPPED | OUTPATIENT
Start: 2025-05-07 | End: 2025-05-19

## 2025-05-07 RX ADMIN — OXYCODONE HYDROCHLORIDE 7.5 MG: 5 TABLET ORAL at 04:19

## 2025-05-07 RX ADMIN — LOSARTAN POTASSIUM 100 MG: 100 TABLET, FILM COATED ORAL at 08:52

## 2025-05-07 RX ADMIN — CYCLOBENZAPRINE HYDROCHLORIDE 5 MG: 5 TABLET, FILM COATED ORAL at 04:19

## 2025-05-07 RX ADMIN — ROSUVASTATIN CALCIUM 5 MG: 5 TABLET, FILM COATED ORAL at 08:53

## 2025-05-07 RX ADMIN — OXYCODONE HYDROCHLORIDE 7.5 MG: 5 TABLET ORAL at 08:53

## 2025-05-07 RX ADMIN — HEPARIN SODIUM 5000 UNITS: 5000 INJECTION, SOLUTION INTRAVENOUS; SUBCUTANEOUS at 04:19

## 2025-05-07 RX ADMIN — DICLOFENAC SODIUM 75 MG: 75 TABLET, DELAYED RELEASE ORAL at 10:11

## 2025-05-07 RX ADMIN — SENNOSIDES AND DOCUSATE SODIUM 1 TABLET: 50; 8.6 TABLET ORAL at 08:52

## 2025-05-07 RX ADMIN — LIDOCAINE 4% 1 PATCH: 40 PATCH TOPICAL at 08:53

## 2025-05-07 RX ADMIN — GABAPENTIN 600 MG: 300 CAPSULE ORAL at 04:19

## 2025-05-07 RX ADMIN — POLYETHYLENE GLYCOL 3350 17 G: 17 POWDER, FOR SOLUTION ORAL at 08:53

## 2025-05-07 RX ADMIN — PREDNISONE 50 MG: 50 TABLET ORAL at 08:52

## 2025-05-07 RX ADMIN — GABAPENTIN 600 MG: 300 CAPSULE ORAL at 11:24

## 2025-05-07 ASSESSMENT — COGNITIVE AND FUNCTIONAL STATUS - GENERAL
MOBILITY SCORE: 24
DAILY ACTIVITIY SCORE: 24

## 2025-05-07 ASSESSMENT — PAIN - FUNCTIONAL ASSESSMENT
PAIN_FUNCTIONAL_ASSESSMENT: 0-10

## 2025-05-07 ASSESSMENT — PAIN SCALES - GENERAL
PAINLEVEL_OUTOF10: 10 - WORST POSSIBLE PAIN
PAINLEVEL_OUTOF10: 0 - NO PAIN
PAINLEVEL_OUTOF10: 0 - NO PAIN
PAINLEVEL_OUTOF10: 10 - WORST POSSIBLE PAIN
PAINLEVEL_OUTOF10: 9

## 2025-05-07 ASSESSMENT — PAIN DESCRIPTION - ORIENTATION: ORIENTATION: RIGHT

## 2025-05-07 ASSESSMENT — PAIN DESCRIPTION - LOCATION
LOCATION: NECK
LOCATION: NECK
LOCATION: ARM

## 2025-05-07 NOTE — NURSING NOTE
Medicated with PRN Oxycodone and flexeril for pain as ordered twice over night. Patient was able to get a few hours of sleep but states pain remains uncontrolled.

## 2025-05-07 NOTE — CARE PLAN
The patient's goals for the shift include      The clinical goals for the shift include see care plan      Problem: Pain  Goal: Takes deep breaths with improved pain control throughout the shift  Outcome: Progressing  Goal: Turns in bed with improved pain control throughout the shift  Outcome: Progressing  Goal: Walks with improved pain control throughout the shift  Outcome: Progressing  Goal: Performs ADL's with improved pain control throughout shift  Outcome: Progressing  Goal: Participates in PT with improved pain control throughout the shift  Outcome: Progressing  Goal: Free from opioid side effects throughout the shift  Outcome: Progressing

## 2025-05-07 NOTE — CARE PLAN
0700: This RN assumed care of pt at this time. Report received from STEFANIA Maddox.    1148: Discharge note- Pt PIV removed prior to discharge. Instructed on discharge teaching, medication changes, and follow-up appointments. M2B delivered prior to discharge. Pt discharged home with no needs a this time.

## 2025-05-07 NOTE — PROGRESS NOTES
"Jericho Dotson \"Kemar\" is a 64 y.o. male on day 2 of admission presenting with Cervical stenosis of spinal canal.    Subjective   Late entry note patient seen prior day and endorses mild improvement at times and upper extremity radiculopathy.  Has begun gabapentin treatment and primary care increasing this for further benefit.  May recommend oral NSAID outpatient until can present to pain management as well  No further decline in strength, sensation in upper extremities  Ambulatory and working with PT      Objective     Physical Exam  General: Well developed, awake/alert/oriented x3, no distress, alert and cooperative  Skin: Warm and dry, no lesions, no rashes  ENMT: Mucous membranes moist, no apparent injury, no lesions seen  Head/Neck: Neck Supple, no apparent injury  Respiratory/Thorax: Normal breath sounds with good chest expansion, thorax symmetric  Cardiovascular: No pitting edema, no JVD    Motor Strength: Slight deficit right upper extremity versus left, otherwise 5/5 Throughout all extremities    Muscle Bulk: Normal and symmetric in all extremities    Posture:   -- Cervical: Normal  -- Thoracic: Normal  -- Lumbar : Normal  Paraspinal muscle spasm/tenderness absent.   Midline tenderness absent      Last Recorded Vitals  Blood pressure 135/86, pulse 51, temperature 36 °C (96.8 °F), temperature source Temporal, resp. rate 16, height 1.626 m (5' 4\"), weight 68.7 kg (151 lb 7.3 oz), SpO2 98%.  Intake/Output last 3 Shifts:  I/O last 3 completed shifts:  In: 780 (11.4 mL/kg) [P.O.:780]  Out: - (0 mL/kg)   Weight: 68.7 kg     Relevant Results         Assessment/Plan   Assessment & Plan  Cervical stenosis of spinal canal    Hypertension, uncontrolled    Hyperlipidemia    Cervical radiculopathy    Bradycardia    Gastroesophageal reflux disease without esophagitis    Cervical stenosis, cervical radiculopathy  Right upper extremity strength and sensation change versus prior week leading to acute presentation  Would " like to avoid surgical intervention if possible and have patient treated for acute presentation of mild symptoms  Outpatient EMG, pain management referral orders  Would follow-up with myself or surgeon outpatient           I spent 45 minutes in the professional and overall care of this patient.      Napoleon Rittre PA-C

## 2025-05-08 ENCOUNTER — PATIENT OUTREACH (OUTPATIENT)
Facility: CLINIC | Age: 65
End: 2025-05-08
Payer: COMMERCIAL

## 2025-05-08 NOTE — PROGRESS NOTES
Discharge Facility: [facility]   Discharge Diagnosis: Cervical stenosis of spinal canal  Admission Date: 5/5/2025  Discharge Date: 5/7/2025    PCP Appointment Date:  -5/9/2025 1320    Specialist Appointment Date:   -5/14/2025 1000 pain management    Hospital Encounter and Summary Linked: Yes    ED to Hosp-Admission (Discharged) with Arben Stanton MD; Yeyo Ashley MD (05/05/2025)     See discharge assessment below for further details    Wrap Up  Wrap Up Additional Comments: Pt was admitted to [facility] 5/5-5/7/2025 for cervical stenosis of spinal canal. Pt reports not doing any better and reports concern regarding going back to work Monday. Pt discharged with prescriptions for tylenol, flexeril, diclofenac, gabapentin, lidocaine, oxycodone, protonix, and prednisone; pt denies questions or issues with medication. Pt states that he is taking all of his medication as prescribed but is still having pain. Pt has follow up with pain management scheduled 5/14/2025 1000. Verified pt upcoming PCP appt 5/9/2025 1320. Pt reports understanding of discharge instructions. Pt states he is going to try to set up physical therapy today. Pt denies any questions, needs, or concerns at this time. Pt encouraged to call if questions or needs arise. (5/8/2025 10:04 AM)  Call End Time: 1012 (5/8/2025 10:04 AM)    Engagement  Call Start Time: 1000 (5/8/2025 10:04 AM)    Medications  Medications reviewed with patient/caregiver?: Yes (new prescriptions reviewed; cyclobenzaprine, diclofenac, gabapentin, lidocaine, oxycodone, protonix, tylenol, prednisone) (5/8/2025 10:04 AM)  Is the patient having any side effects they believe may be caused by any medication additions or changes?: No (5/8/2025 10:04 AM)  Does the patient have all medications ordered at discharge?: Yes (5/8/2025 10:04 AM)  Care Management Interventions: No intervention needed (5/8/2025 10:04 AM)  Is the patient taking all medications as directed (includes completed  medication regime)?: Yes (5/8/2025 10:04 AM)    Appointments  Does the patient have a primary care provider?: Yes (5/8/2025 10:04 AM)  Care Management Interventions: Verified appointment date/time/provider (5/8/2025 10:04 AM)  Has the patient kept scheduled appointments due by today?: Yes (5/8/2025 10:04 AM)    Self Management  Has home health visited the patient within 72 hours of discharge?: Not applicable (5/8/2025 10:04 AM)    Patient Teaching  Does the patient have access to their discharge instructions?: Yes (5/8/2025 10:04 AM)  Care Management Interventions: Reviewed instructions with patient (5/8/2025 10:04 AM)  What is the patient's perception of their health status since discharge?: Improving (5/8/2025 10:04 AM)  Is the patient/caregiver able to teach back the hierarchy of who to call/visit for symptoms/problems? PCP, Specialist, Home Health nurse, Urgent Care, ED, 911: Yes (5/8/2025 10:04 AM)

## 2025-05-09 ENCOUNTER — OFFICE VISIT (OUTPATIENT)
Facility: CLINIC | Age: 65
End: 2025-05-09
Payer: COMMERCIAL

## 2025-05-09 VITALS
TEMPERATURE: 97.7 F | RESPIRATION RATE: 18 BRPM | WEIGHT: 153 LBS | BODY MASS INDEX: 26.12 KG/M2 | HEIGHT: 64 IN | OXYGEN SATURATION: 97 % | DIASTOLIC BLOOD PRESSURE: 87 MMHG | SYSTOLIC BLOOD PRESSURE: 136 MMHG | HEART RATE: 87 BPM

## 2025-05-09 DIAGNOSIS — M54.12 CERVICAL RADICULOPATHY: ICD-10-CM

## 2025-05-09 DIAGNOSIS — M48.02 CERVICAL STENOSIS OF SPINAL CANAL: ICD-10-CM

## 2025-05-09 PROCEDURE — 99495 TRANSJ CARE MGMT MOD F2F 14D: CPT | Performed by: FAMILY MEDICINE

## 2025-05-09 PROCEDURE — 1036F TOBACCO NON-USER: CPT | Performed by: FAMILY MEDICINE

## 2025-05-09 PROCEDURE — 3008F BODY MASS INDEX DOCD: CPT | Performed by: FAMILY MEDICINE

## 2025-05-09 PROCEDURE — 3079F DIAST BP 80-89 MM HG: CPT | Performed by: FAMILY MEDICINE

## 2025-05-09 PROCEDURE — 3075F SYST BP GE 130 - 139MM HG: CPT | Performed by: FAMILY MEDICINE

## 2025-05-09 RX ORDER — LIDOCAINE 560 MG/1
1 PATCH PERCUTANEOUS; TOPICAL; TRANSDERMAL DAILY
Qty: 30 PATCH | Refills: 0 | Status: SHIPPED | OUTPATIENT
Start: 2025-05-09

## 2025-05-09 ASSESSMENT — PATIENT HEALTH QUESTIONNAIRE - PHQ9
2. FEELING DOWN, DEPRESSED OR HOPELESS: NEARLY EVERY DAY
5. POOR APPETITE OR OVEREATING: NOT AT ALL
4. FEELING TIRED OR HAVING LITTLE ENERGY: NOT AT ALL
8. MOVING OR SPEAKING SO SLOWLY THAT OTHER PEOPLE COULD HAVE NOTICED. OR THE OPPOSITE, BEING SO FIGETY OR RESTLESS THAT YOU HAVE BEEN MOVING AROUND A LOT MORE THAN USUAL: MORE THAN HALF THE DAYS
3. TROUBLE FALLING OR STAYING ASLEEP OR SLEEPING TOO MUCH: NEARLY EVERY DAY
SUM OF ALL RESPONSES TO PHQ9 QUESTIONS 1 AND 2: 3
SUM OF ALL RESPONSES TO PHQ QUESTIONS 1-9: 10
6. FEELING BAD ABOUT YOURSELF - OR THAT YOU ARE A FAILURE OR HAVE LET YOURSELF OR YOUR FAMILY DOWN: NOT AT ALL
9. THOUGHTS THAT YOU WOULD BE BETTER OFF DEAD, OR OF HURTING YOURSELF: NOT AT ALL
1. LITTLE INTEREST OR PLEASURE IN DOING THINGS: NOT AT ALL
10. IF YOU CHECKED OFF ANY PROBLEMS, HOW DIFFICULT HAVE THESE PROBLEMS MADE IT FOR YOU TO DO YOUR WORK, TAKE CARE OF THINGS AT HOME, OR GET ALONG WITH OTHER PEOPLE: EXTREMELY DIFFICULT
7. TROUBLE CONCENTRATING ON THINGS, SUCH AS READING THE NEWSPAPER OR WATCHING TELEVISION: MORE THAN HALF THE DAYS

## 2025-05-09 ASSESSMENT — ENCOUNTER SYMPTOMS
HEADACHES: 0
SHORTNESS OF BREATH: 0

## 2025-05-09 NOTE — ASSESSMENT & PLAN NOTE
Reviewed hospitalization  Completed short term disability paperwork today.   Continue current medications, continue with PT and follow up with pain management next week.  Follow up if no improvement or if symptoms worsen.  Proceed to the nearest emergency department if condition worsens significantly/becomes severe in nature.     Orders:    lidocaine 4 % patch; Place 1 patch over 12 hours on the skin once daily. Remove & discard patch within 12 hours or as directed by MD.

## 2025-05-09 NOTE — ASSESSMENT & PLAN NOTE
Orders:    lidocaine 4 % patch; Place 1 patch over 12 hours on the skin once daily. Remove & discard patch within 12 hours or as directed by MD.

## 2025-05-12 ENCOUNTER — PATIENT OUTREACH (OUTPATIENT)
Facility: CLINIC | Age: 65
End: 2025-05-12
Payer: COMMERCIAL

## 2025-05-12 NOTE — PROGRESS NOTES
Confirmation of at least 2 patient identifiers.    Completed telephonic follow-up with patient after recent visit with Dr. Prather 5/9/90646.     Spoke to patient during outreach call.    Patient reports feeling: No change from previous encounter     Patient has questions or concerns about medications: No    Have all prescribed medications been filled? Yes    Patient has necessary resources to manage their care? Yes    Patient has questions or concerns? No    Next care management follow-up approximately within one month.    -Verified upcoming pain management appt 5/14/2025 1000    Pt states he is still in pain. He reports he did start rehab yesterday and will be going for more therapy tomorrow morning. Pt denies any questions, needs, or concerns at this time. Pt encouraged to call if questions or needs arise.

## 2025-05-13 DIAGNOSIS — M48.02 STENOSIS OF CERVICAL SPINE: ICD-10-CM

## 2025-05-13 DIAGNOSIS — M54.12 CERVICAL RADICULOPATHY: ICD-10-CM

## 2025-05-13 RX ORDER — OXYCODONE AND ACETAMINOPHEN 5; 325 MG/1; MG/1
1 TABLET ORAL EVERY 12 HOURS PRN
Qty: 14 TABLET | Refills: 0 | OUTPATIENT
Start: 2025-05-13 | End: 2025-05-20

## 2025-05-14 ENCOUNTER — TELEPHONE (OUTPATIENT)
Facility: CLINIC | Age: 65
End: 2025-05-14

## 2025-05-14 ENCOUNTER — OFFICE VISIT (OUTPATIENT)
Dept: PAIN MEDICINE | Facility: CLINIC | Age: 65
End: 2025-05-14
Payer: COMMERCIAL

## 2025-05-14 DIAGNOSIS — M54.12 CERVICAL RADICULOPATHY: ICD-10-CM

## 2025-05-14 PROBLEM — E78.2 HYPERLIPIDEMIA, MIXED: Status: ACTIVE | Noted: 2025-05-14

## 2025-05-14 PROBLEM — I10 ESSENTIAL HYPERTENSION: Status: ACTIVE | Noted: 2018-03-06

## 2025-05-14 PROCEDURE — 99204 OFFICE O/P NEW MOD 45 MIN: CPT | Performed by: PAIN MEDICINE

## 2025-05-14 PROCEDURE — 99214 OFFICE O/P EST MOD 30 MIN: CPT | Performed by: PAIN MEDICINE

## 2025-05-14 PROCEDURE — G2211 COMPLEX E/M VISIT ADD ON: HCPCS | Performed by: PAIN MEDICINE

## 2025-05-14 ASSESSMENT — PAIN SCALES - GENERAL: PAINLEVEL_OUTOF10: 7

## 2025-05-14 NOTE — H&P
"History Of Present Illness  Jericho Dotson \"Kemar\" is a 64 y.o. male presenting with Neck pain that goes down shoulder and  right arm , does have numbness and pain for the last few months  ,the pain is constant 9-10 does increase the pain with movement like neck rotation and tilting the neck , unable to make the pain go away  Describes spasm like pain at times it gets really intense like jolting sensation Describes nerve shooting pain.Does wear a neck brace at night  Is presently in PT described as helping some Is using moist heat but then the pain comes back .  Since starting flexeril gabapentin oxycodone prednisone despite using all the medication he described himself as still in pain, added ibuprofen 600 mg  with minimal results   Has not had any type of injections     Past Medical History  Medical History[1]  Problem List[2]    Surgical History  Surgical History[3]  Social History  He reports that he has never smoked. He has never used smokeless tobacco. He reports that he does not currently use alcohol. He reports that he does not currently use drugs after having used the following drugs: Marijuana.    Family History  Family History[4]     Allergies  Allergies[5]  Review of Systems   12 Systems have been reviewed as follows.   Constitutional: Fever, weight gain, weight loss, appetite change, night sweats, fatigue, chills.  Eyes : blurry, double vision, vision, loss, tearing, redness, pain, sensitivity to light, glaucoma.  Ears, nose, mouth, and throat: Hearing loss, ringing in the ears, ear pain, nasal congestion, nasal drainage, nosebleeds, mouth, throat, irritation tooth problem.  Cardiovascular :chest pain, pressure, heart tracing,palpaitations , sweating, leg swelling, high or low blood pressure  Pulmonary: Cough, yellow or green sputum, blood and sputum, shortness of breath, wheezing  Gastrointestinal: Nause, vomiting, diarrhea, constipation, pain, blood in stool, or vomitus, heartburn, difficulty " swallowing  Genitourinary: incontinence, abnormal bleeding, abnormal discharge, urinary frequency, urinary hesitancy, pain, impotence sexual problem, infection, urinary retention  Musculoskeletal: Pain, stiffness, joint, redness or warmth, arthritis, back pain, weakness, muscle wasting, sprain or fracture  Neuro: Weight weakness, dizziness, change in voice, change in taste change in vision, change in hearing, loss, or change of sensation, trouble walking, balance problems coordination problems, shaking, speech problem  Endocrine , cold or heat intolerance, blood sugar problem, weight gain or loss missed periods hot flashes, sweats, change in body hair, change in libido, increased thirst, increased urination  Heme/lymph: Swelling, bleeding, problem anemia, bruising, enlarged lymph nodes  Allergic/immunologic: H. plus nasal drip, watery itchy eyes, nasal drainage, immunosuppressed  The above, were reviewed and noted negative except as noted.     Physical Exam   Vital signs reviewed, documented in chart     General:  Appears well, does not look in any major distress  Alert    HEENT:  Head atraumatic  Eyes normal inspection  PERRL  Normal ENT inspection  No signs of dehydration    NECK:  Normal inspection  Range of motion limited on flexion extension and rotation secondary to the pain    RESPIRATORY:  No respiratory distress    CVS:  Heart rate and rhythm regular    ABDOMEN/GI  Soft  Non-tender  No distention  No organomegaly     EXTREMITIES:  Non-Tender  Full ROM  Normal appearance  No Pedal edema  Power symmetrical , sensory examination preserved.    NEURO:  Alert and oriented X 3  CNS normal as tested without focal neurological deficit   Sensation normal  Motor normal  reflexes normal    PSYCH:  Mood normal  Affect normal    SKIN:  Color normal  No rash  Warm  Dry  no sign of skin marking supportive of IV drug usage /abuse.     Last Recorded Vitals  There were no vitals taken for this visit.  MR cervical spine w and  wo IV contrast  Result Date: 5/5/2025  Interpreted By:  Judah Vasquez, STUDY: MR CERVICAL SPINE W AND WO IV CONTRAST; ;  5/5/2025 8:26 am   INDICATION: Signs/Symptoms:intractable neck pain. moderate central canal stenosis on CT, severe foraminal stensosis.     COMPARISON: CT cervical spine from 05/05/2025.   ACCESSION NUMBER(S): [id number]   ORDERING CLINICIAN: QUE DALTON   TECHNIQUE: MRI of the cervical spine was performed with the acquisition of sagittal T2, sagittal T1, sagittal STIR, axial T1, axial T2, sagittal and axial T1 weighted postcontrast sequences.   Contrast: 14 mL of Dotarem was injected intravenously.   FINDINGS: There is reversal of normal cervical lordosis. There is grade 1 anterolisthesis at C2-C3 and mild retrolisthesis at C4-C5 and C5-C6. Vertebral body heights are maintained. There is moderate intervertebral disc height narrowing at C4-C5 and C5-C6 and mild intervertebral disc height narrowing at C6-C7. There are chronic degenerative endplate changes at multiple levels including osteophytic spurring. No striking edematous signal is seen within the visualized osseous structures. Marrow signal is overall within normal limits. The cervical spinal cord is normal in signal and caliber. There is no mass or abnormal enhancement located within the cervical spinal cord or canal.   At C2-C3, there is no posterior disc contour abnormality. There is no spinal canal stenosis. There is no striking neural foraminal narrowing. There is marked right facet osteoarthropathy.   At C3-C4, there is no striking posterior disc contour abnormality or spinal canal stenosis. There is moderate right and moderate-to-marked left neural foraminal narrowing due to uncovertebral hypertrophy. Narrowing of the left is also related to marked facet osteoarthropathy.   At C4-C5, there is a disc osteophyte complex which along with retrolisthesis causes mild-to-moderate spinal canal stenosis. There is marked right and moderate  left neural foraminal narrowing due to uncovertebral hypertrophy. There is no striking facet osteoarthropathy.   At C5-C6, there is a disc osteophyte complex which along with ligamentum flavum buckling/thickening causes marked spinal canal stenosis. There is mild to moderate left and marked right neural foraminal narrowing due to uncovertebral hypertrophy. There is no striking facet osteoarthropathy.   At C6-C7, there is a disc osteophyte complex which along with ligamentum flavum buckling/thickening causes marked spinal canal stenosis. There is moderate-to-marked right and moderate left neural foraminal narrowing due to uncovertebral hypertrophy. There is no striking facet osteoarthropathy.   At C7-T1, there is no posterior disc contour abnormality. There is no spinal canal stenosis or neural foraminal narrowing. There is moderate right facet osteoarthropathy.       Multilevel degenerative changes of the cervical spine including moderate spinal canal stenosis at C4-C5 and marked spinal canal stenosis at C5-C6 and C6-C7. There is mass effect on the cervical spinal cord but there is no cord signal abnormality. There is variable degree of neural foraminal narrowing at multiple levels as detailed above.   This study was interpreted at [facility].     MACRO: None   Signed by: Judah Vasquez 5/5/2025 8:37 AM Dictation workstation:   [id number]    CT cervical spine wo IV contrast  Result Date: 5/5/2025  Interpreted By:  Justin Amador, STUDY: CT CERVICAL SPINE WO IV CONTRAST;  5/5/2025 5:09 am   INDICATION: Signs/Symptoms:Radicular neck pain.  Evaluate for possible occult fracture.   COMPARISON: None.   ACCESSION NUMBER(S): [id number]   ORDERING CLINICIAN: AG CHOE   TECHNIQUE: Axial CT images of the cervical spine are obtained. Axial, coronal and sagittal reconstructions are provided for review.   FINDINGS:     Fractures: There is no evidence for an acute fracture of the cervical  spine. Bone mineralization is normal.   Vertebral Alignment: There is reversal of normal cervical lordosis. Mild degenerative retrolisthesis of C4 over C5 and C5 over C6.   Craniocervical Junction: The odontoid process and craniocervical junction are intact.   Vertebrae/Disc Spaces:  Vertebral body heights are maintained. There is severe degenerative disc space narrowing at C4-C5 and C5-C6 with associated subchondral reactive change, and to lesser extent degenerative space narrowing at C6-C7.   There is also synovial facet arthropathy in the midcervical spine. This contributes to moderate to severe bilateral foraminal stenosis at C3-C4, C4-C5, and C5-C6. Moderate bilateral foraminal stenosis at C6-C7.   There is suspected moderate central canal stenosis at C4-C5 and C5-C6 due to degenerative retrolisthesis and central disc osteophyte complexes.   Prevertebral/Paraspinal Soft Tissues: The prevertebral and paraspinal soft tissues are unremarkable.         No evidence of fracture. Advanced degenerative cervical spondylosis with multilevel moderate to severe bilateral foraminal stenosis as described above. There is also suspected moderate central canal stenosis at C4-C5 and C5-C6. Consider follow-up MRI of cervical spine to better characterize.   Signed by: Justin Amador 5/5/2025 5:18 AM Dictation workstation:   [id number]    XR cervical spine 2-3 views  Result Date: 5/2/2025  Interpreted By:  Budinsky, Cole, STUDY: XR CERVICAL SPINE 2-3 VIEWS; ;  5/2/2025 2:09 pm   INDICATION: Signs/Symptoms:pain.   ACCESSION NUMBER(S): [id number]   ORDERING CLINICIAN: COLE BUDINSKY       Cervical spine films demonstrate no presence for fracture or listhesis. Reversal of the cervical lordotic curve is noted. Moderate to severe degenerative disc disease and ventral osteophytes are present and most noted at the C4-C5, C5-C6, C6-C7 levels.     Signed by: Cole Budinsky 5/2/2025 5:57 PM Dictation workstation:    [id number]      Assessment/Plan   64 years old with history and physical examination supportive of cervical stenosis and cervical radiculopathy tried and failed conservative management    Plan  Knowing that the patient has tried multiple modalities of treatment and he continues to have significant amount of pain I would recommend a cervical epidural steroid injection to be performed under fluoroscopic guidance targeting the C7T1 level with injection of contrast material under fluoroscopic guidance I will reevaluate the patient performance of the epidural for further recommendation as his case progress patient benefits and risks were discussed and he would like to proceed  Advised the patient to continue on the gabapentin at the 600 mg 3 times per day and to continue to be on the Flexeril 5 mg up to 3 times per day in terms of the usage of the opiate I discouraged the patient from the continuation of the oxycodone on the long-term use secondary to the risk associated with the long-term usage of the opiate and the dependency associated with their use.  Continuing with ibuprofen and Tylenol is advisable.    The above clinical summary has been dictated with voice recognition software. It has not been proofread for grammatical errors, typographical mistakes, or other semantic inconsistencies.    Thank you for visiting our office today. It was our pleasure to take part in your healthcare.     Please do not hesitate to contact the pain clinic after your visit with any questions or concerns at  M-F 8-4 pm       Carmelita Florez M.D.  Medical Director , Division of Pain Medicine [facility]   of Anesthesiology and Pain Medicine  [facility] [employer]     [place] [address] [address] [place] [address]     Office: [phone]  Fax: [phone]      Carmelita Florez MD       [1] No past medical history on file.  [2]   Patient Active Problem List  Diagnosis    Hypertension, uncontrolled    Hyperlipidemia    Chest pain, atypical    Arthritis of hand, left    Ectatic thoracic aorta (CMS-HCC)    At increased risk for cardiovascular disease    Osteoarthritis    Cervical radiculopathy    Palpitations    Snores    Fatigue    Status post appendectomy    Abnormal EKG    Bradycardia    Gastroesophageal reflux disease without esophagitis    Cervical stenosis of spinal canal    Hyperlipidemia, mixed    Essential hypertension   [3]   Past Surgical History:  Procedure Laterality Date    OTHER SURGICAL HISTORY  09/17/2021    Stomach surgery    OTHER SURGICAL HISTORY  09/17/2021    Tonsillectomy    OTHER SURGICAL HISTORY  06/15/2022    Hernia repair    OTHER SURGICAL HISTORY  06/15/2022    Appendectomy   [4]   Family History  Problem Relation Name Age of Onset    Diabetes Father      Breast cancer Paternal Grandmother     [5] No Known Allergies

## 2025-05-14 NOTE — PROGRESS NOTES
DATE OF PROCEDURE:  08/30/2019



PREOPERATIVE DIAGNOSIS:  Left carpal tunnel syndrome.



POSTOPERATIVE DIAGNOSIS:  Left carpal tunnel syndrome.



PROCEDURES PERFORMED:  

1. Left open carpal tunnel release.

2. Placement of short-arm volar splint, left upper extremity.



ASSISTANT:  None.



BLOOD LOSS:  Minimal.



COMPLICATIONS:  None.



ANESTHESIA:  She had TIVA with local.



DISPOSITION:  She did go back to Day Stay in stable condition.



INDICATIONS:  A 71-year-old female, who has multiple medical issues, but who

developed severe numbness, tingling, and pain in her left upper extremity over the

last 3 to 4 weeks.  EMG/NCV confirmed carpal tunnel syndrome.  At this time, she

opted to have this released. 



DESCRIPTION OF PROCEDURE:  After all appropriate consent forms were explained and

signed, the patient was taken back to the operating room and at this time was given

IV sedation.  A well-padded tourniquet was placed on the left arm and the arm was

then prepped and draped in the standard surgical fashion.  The incision was then

drawn out and infiltrated with plain lidocaine.  Limb was exsanguinated and

tourniquet taken up to 250 mmHg.  At this time, using Loupe magnification, a 15

blade was used to incise down through skin.  Bipolar cautery was used to coagulate

any brisk venous bleeding.  We then placed a small hemostat to protect the

underlying median nerve and transected the transverse carpal ligament using multiple

15 blades as well as scissors.  Once this was done, a small finger was inserted to

palpate for any remaining bands.  At this time, a moist Ray-Deisy sponge was placed

into the wound.  Tourniquet was let down and pressure was held.  Bipolar cautery

used to coagulate any brisk venous bleeding.  The wound was then irrigated with

saline solution and we then evaluated the nerve.  The nerve was found to be

significantly injected, the nerve was intact, there were no masses noted, and the

underlying flexor tendons were in good condition.  At this time, we irrigated and

dried the wound.  We then placed multiple nylon stitches to close the incision.  A

bulky sterile hand dressing and a small volar splint were then placed.  The patient

was then awakened and taken to recovery in stable condition.  All counts were

correct at the end of the case.  The patient received preoperative IV antibiotics. 







Job ID:  [id number] Neck pain goes down shoulder in Right arm  does have numbness and pain  the pain is constant does increase the pain with movement  unable to make the pain go away  Describes spasm like pain at times it gets really intense  Does wear a neck brace at night  Is presently in PT  Is using moist heat  Describes nerve shooting pain    Since starting flexeril gabapentin oxycodone prednisone  Has not had any type of injections

## 2025-05-14 NOTE — TELEPHONE ENCOUNTER
Pt called and stated that he went to his pain management meeting and was calling to talk to you about it.  He said you told him to call after he went to the meeting.  He can not get an injection for two weeks.  He has appointment scheduled for 5/29/25 with them to get the injection

## 2025-05-15 NOTE — TELEPHONE ENCOUNTER
Per pt, Alma never received the paper work that our office was supposed to send. He was told by our office that it was faxed last Friday. This is holding him from receiving a pay check. Please advise.

## 2025-05-16 ENCOUNTER — HOSPITAL ENCOUNTER (OUTPATIENT)
Dept: RADIOLOGY | Facility: CLINIC | Age: 65
Discharge: HOME | End: 2025-05-16
Payer: COMMERCIAL

## 2025-05-16 ENCOUNTER — TELEPHONE (OUTPATIENT)
Facility: CLINIC | Age: 65
End: 2025-05-16
Payer: COMMERCIAL

## 2025-05-16 NOTE — TELEPHONE ENCOUNTER
Per Vi from [place], they need an estimated return to work for this patient and the next date of treatment. Please call her back at her direct number:  [phone]   OK to leave a message in detail, please mention pt's name,  and Claim number #[id number]

## 2025-05-19 ENCOUNTER — TELEPHONE (OUTPATIENT)
Facility: CLINIC | Age: 65
End: 2025-05-19
Payer: COMMERCIAL

## 2025-05-19 NOTE — TELEPHONE ENCOUNTER
Per pt, he just realized that his discharge papers mention that he needs to have an EMG. Please advise.

## 2025-05-29 ENCOUNTER — APPOINTMENT (OUTPATIENT)
Dept: CARDIOLOGY | Facility: CLINIC | Age: 65
End: 2025-05-29
Payer: COMMERCIAL

## 2025-05-29 ENCOUNTER — HOSPITAL ENCOUNTER (OUTPATIENT)
Dept: PAIN MEDICINE | Facility: CLINIC | Age: 65
Discharge: HOME | End: 2025-05-29
Payer: COMMERCIAL

## 2025-05-29 VITALS
TEMPERATURE: 97.3 F | RESPIRATION RATE: 20 BRPM | DIASTOLIC BLOOD PRESSURE: 84 MMHG | HEART RATE: 59 BPM | OXYGEN SATURATION: 98 % | SYSTOLIC BLOOD PRESSURE: 151 MMHG

## 2025-05-29 DIAGNOSIS — M54.12 CERVICAL RADICULOPATHY: ICD-10-CM

## 2025-05-29 PROCEDURE — 2500000004 HC RX 250 GENERAL PHARMACY W/ HCPCS (ALT 636 FOR OP/ED): Performed by: PAIN MEDICINE

## 2025-05-29 PROCEDURE — 62321 NJX INTERLAMINAR CRV/THRC: CPT | Performed by: PAIN MEDICINE

## 2025-05-29 PROCEDURE — 2550000001 HC RX 255 CONTRASTS: Performed by: PAIN MEDICINE

## 2025-05-29 RX ORDER — METHYLPREDNISOLONE ACETATE 80 MG/ML
INJECTION, SUSPENSION INTRA-ARTICULAR; INTRALESIONAL; INTRAMUSCULAR; SOFT TISSUE AS NEEDED
Status: COMPLETED | OUTPATIENT
Start: 2025-05-29 | End: 2025-05-29

## 2025-05-29 RX ORDER — LIDOCAINE HYDROCHLORIDE 10 MG/ML
INJECTION, SOLUTION EPIDURAL; INFILTRATION; INTRACAUDAL; PERINEURAL AS NEEDED
Status: COMPLETED | OUTPATIENT
Start: 2025-05-29 | End: 2025-05-29

## 2025-05-29 RX ADMIN — METHYLPREDNISOLONE ACETATE 80 MG: 80 INJECTION, SUSPENSION INTRA-ARTICULAR; INTRALESIONAL; INTRAMUSCULAR; SOFT TISSUE at 13:29

## 2025-05-29 RX ADMIN — IOHEXOL 1 ML: 300 INJECTION, SOLUTION INTRAVENOUS at 13:29

## 2025-05-29 RX ADMIN — LIDOCAINE HYDROCHLORIDE 1 ML: 10 INJECTION, SOLUTION EPIDURAL; INFILTRATION; INTRACAUDAL; PERINEURAL at 13:28

## 2025-05-29 ASSESSMENT — PATIENT HEALTH QUESTIONNAIRE - PHQ9
1. LITTLE INTEREST OR PLEASURE IN DOING THINGS: NOT AT ALL
2. FEELING DOWN, DEPRESSED OR HOPELESS: NOT AT ALL
SUM OF ALL RESPONSES TO PHQ9 QUESTIONS 1 AND 2: 0

## 2025-05-29 ASSESSMENT — COLUMBIA-SUICIDE SEVERITY RATING SCALE - C-SSRS
2. HAVE YOU ACTUALLY HAD ANY THOUGHTS OF KILLING YOURSELF?: NO
6. HAVE YOU EVER DONE ANYTHING, STARTED TO DO ANYTHING, OR PREPARED TO DO ANYTHING TO END YOUR LIFE?: NO
1. IN THE PAST MONTH, HAVE YOU WISHED YOU WERE DEAD OR WISHED YOU COULD GO TO SLEEP AND NOT WAKE UP?: NO

## 2025-05-29 ASSESSMENT — PAIN DESCRIPTION - DESCRIPTORS: DESCRIPTORS: SPASM

## 2025-05-29 ASSESSMENT — PAIN SCALES - GENERAL: PAINLEVEL_OUTOF10: 6

## 2025-05-29 ASSESSMENT — PAIN - FUNCTIONAL ASSESSMENT: PAIN_FUNCTIONAL_ASSESSMENT: 0-10

## 2025-05-29 NOTE — DISCHARGE INSTRUCTIONS
Post-injection instructions:    Your pain may not be gone immediately after the procedure--it usually takes the steroid 3-5 days to start working.   It may take several weeks for the medicine to reach its' full effect.   Pay attention to how much pain relief (what percentage compared to before the procedure) you get and for how long it lasts.     Activity: Avoid strenuous activity for 24 hours. After that return to your normal activity level.     Bandages: Remove after 24 hours     Showering/Bathing: You may shower after bandage is removed     Follow up: CALL OFFICE IN 7 DAYS [phone] LEAVE MESSAGE ABOUT THE RELIEF THAT WAS OBTAINED      Call the doctor immediately: if you notice:     Excessive bleeding from procedure site (brisk bright red bleeding from the site or bleeding that soaks the bandages or does not stop)   Severe headache  Inability to walk, leg or arm weakness or numbness that is worse after the procedure   Uncontrolled pain   New urinary or fecal incontinence   Signs of infection: Fever above 101.5F, redness, swelling, pus or drainage from the site    Epidural Injection    Why is this procedure done?  With an epidural injection, the doctor injects drugs deep into the area around your spinal cord. This is different than epidural anesthesia that is used for surgery or when a woman has a baby. Your spine is a group of bones in your back that protect the nerves in your spinal cord. Problems with your spine can cause swollen nerves in the spinal cord. This swelling leads to pain and can limit movement. In an epidural injection, the doctor may give you a drug to help with swelling and pain.  You may have an epidural injection in different parts of your back, based on where your pain is. For pain in your head or arms, you may have a cervical epidural injection. If your pain is in your upper or middle back, you may get a thoracic epidural injection. For pain in your lower back or legs, you may get a  lumbar epidural injection.    What will the results be?  The treatment may:  Lower pain  Reduce swelling in the nerves  Improve movement  What happens before the procedure?  Your doctor will take your history. Talk to the doctor about:  All the drugs you are taking. Be sure to include all prescription and over-the-counter (OTC) drugs, and herbal supplements. Tell the doctor about any drug allergy. Bring a list of drugs you take with you.  If you have high blood sugar or diabetes. Your drugs may need to be changed.  Any bleeding problems. Be sure to tell your doctor if you are taking any drugs that may cause bleeding. Some of these are warfarin, rivaroxaban, apixaban, ticagrelor, clopidogrel, ketorolac, ibuprofen, naproxen, or aspirin. Certain vitamins and herbs, such as garlic and fish oil, may also add to the risk for bleeding. You may need to stop these drugs as well. Talk to your doctor about them.  Tell the doctor if you are pregnant.  You will not be allowed to drive right away after the procedure. Ask a family member or a friend to drive you home.  What happens during the procedure?  To help the doctor make sure the drugs are being injected in the right place, your doctor may do an x-ray of your spine. Other times your doctor may do a continuous x-ray during the procedure. This is a fluoroscopy. The doctor may also use a colored dye or a contrast dye to check where to inject the drug.  You may be given a drug to help you relax. You may be given a drug to make the area of the injection numb.  The doctor will clean the skin on your back or neck. This helps prevent infection.  The doctor will put a needle through the skin toward your spine. The drug will be injected into a space near the spine.  The needle will be taken out and a bandage will be placed over the injection site.  What happens after the procedure?  Staff will check on you to make sure you are doing well. They will tell you when you can go  home.  What care is needed at home?  Relax on the day of the injection.  Do not drive or run machines for at least 12 hours afterwards.  Apply ice to the injection site. Place an ice pack or a bag of frozen peas wrapped in a towel over the painful part. Never put ice right on the skin. Do not leave the ice on more than 10 to 15 minutes at a time.  It may take a few days before you will feel the effects of the injection.  What follow-up care is needed?  Your doctor may ask you to make visits to the office to check on your progress. Be sure to keep these visits. You may also need to see a physical therapist (PT). The PT will teach you exercises to help you get back your strength and motion. Ask your doctor when you can exercise.  What problems could happen?  Bleeding  Infection (rare)  Headache  Nerve injury  If you have diabetes, your blood sugar can go up after the injection. Check with your doctor if you need more treatment for this.  Last Reviewed Date

## 2025-06-03 ENCOUNTER — TELEPHONE (OUTPATIENT)
Facility: CLINIC | Age: 65
End: 2025-06-03
Payer: COMMERCIAL

## 2025-06-03 NOTE — TELEPHONE ENCOUNTER
Pt called asking for more time off work. He just got a cervical epidural and it didn't help. He continues to have numbness and tingling in his fingers. He will wait another day to call his surgeon. He is wondering if pain is something he will have to deal with for the rest of his life, or if he is headed for surgery. Please advise.

## 2025-06-06 ENCOUNTER — OFFICE VISIT (OUTPATIENT)
Facility: CLINIC | Age: 65
End: 2025-06-06
Payer: COMMERCIAL

## 2025-06-06 VITALS
DIASTOLIC BLOOD PRESSURE: 58 MMHG | HEART RATE: 58 BPM | BODY MASS INDEX: 26.26 KG/M2 | TEMPERATURE: 98.4 F | RESPIRATION RATE: 18 BRPM | OXYGEN SATURATION: 96 % | WEIGHT: 153 LBS | SYSTOLIC BLOOD PRESSURE: 141 MMHG

## 2025-06-06 DIAGNOSIS — M48.02 CERVICAL STENOSIS OF SPINAL CANAL: Primary | ICD-10-CM

## 2025-06-06 PROCEDURE — 99213 OFFICE O/P EST LOW 20 MIN: CPT | Performed by: FAMILY MEDICINE

## 2025-06-06 PROCEDURE — 3078F DIAST BP <80 MM HG: CPT | Performed by: FAMILY MEDICINE

## 2025-06-06 PROCEDURE — 1036F TOBACCO NON-USER: CPT | Performed by: FAMILY MEDICINE

## 2025-06-06 PROCEDURE — 3077F SYST BP >= 140 MM HG: CPT | Performed by: FAMILY MEDICINE

## 2025-06-06 NOTE — ASSESSMENT & PLAN NOTE
Follow up with pain management  Short term disability paperwork addressed.  Extension of disability recommended for three more weeks due to persistent neck pain and radiculopathy and inability to perform normal work duties.

## 2025-06-06 NOTE — PROGRESS NOTES
"Dmitri Dotson \"Kemar\" is a 64 y.o. male who presents for Neck Pain.    HPI     Patient is here today for follow up on his neck pain/cervical stenosis/cervical radiculopathy.  He is on short term leave from work.  He  is still unable to work at all at this time due to his neck pain.  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  He has short term disability paperwork today that needs to be updated.  He does not feel physically ready to go back to work.  He is still unable to lift anything heavy and still has significant shooting pain, numbness and tingling in his right upper extremity.  His radiculopathy is worsened by neck rotation to the right.  He has trouble holding on to things with his right hand.  He does not feel safe going back to work at this time.      Pt is seeing  pain management, Dr. Florez.  He has failed conservative management and his pain specialist recommended cervical epidural steroid injection which he received on 5/29/25.  He did not feel the cervical epidural helped.  He continues to have neck pain with right upper extremity radiculopathy.  Pt is getting physical therapy which he feels has helped become more mobile.  Now patient is working on strengthening with his physical therapist.  Pt did stop taking gabapentin after his epidural because he thought he was supposed to.      Pt is requesting to extend his leave from work until 6/27/25.     He wants to consider getting another cervical epidural injection.  He reached out to his pain specialist and left a message and is waiting for a response.          Objective     Vitals:    06/06/25 1257   BP: 141/58   BP Location: Left arm   Patient Position: Sitting   Pulse: 58   Resp: 18   Temp: 36.9 °C (98.4 °F)   TempSrc: Temporal   SpO2: 96%   Weight: 69.4 kg (153 lb)        Current Outpatient Medications   Medication Instructions    acetaminophen (TYLENOL) 975 mg, oral, Every 8 hours    blood pressure monitor (Blood Pressure Kit) " kit 1 kit, miscellaneous, Daily PRN    diclofenac (VOLTAREN) 75 mg, oral, 2 times daily, Do not crush, chew, or split.    gabapentin (NEURONTIN) 600 mg, oral, Every 8 hours scheduled    lidocaine 4 % patch 1 patch, transdermal, Daily, Remove & discard patch within 12 hours or as directed by MD.    losartan (COZAAR) 100 mg, oral, Daily    pantoprazole (PROTONIX) 40 mg, oral, Daily before breakfast, Do not crush, chew, or split.    rosuvastatin (CRESTOR) 5 mg, oral, Daily    tadalafil (CIALIS) 5 mg, oral, Daily        RX Allergies[1]     Surgical History[2]     Social History[3]     Family History[4]     Immunization History   Administered Date(s) Administered    COVID-19, mRNA, LNP-S, PF, 30 mcg/0.3 mL dose 05/04/2021, 05/24/2021, 12/11/2021    Flu vaccine, trivalent, preservative free, age 6 months and greater (Fluarix/Fluzone/Flulaval) 10/18/2024    Pfizer COVID-19 vaccine, bivalent, age 12 years and older (30 mcg/0.3 mL) 10/22/2022    Td vaccine, age 7 years and older (TDVAX) 01/01/2000    Tdap vaccine, age 7 year and older (BOOSTRIX, ADACEL) 04/03/2015, 09/17/2021    Zoster vaccine, recombinant, adult (SHINGRIX) 09/17/2021, 04/15/2025        Lab Results   Component Value Date    WBC 7.8 05/07/2025    RBC 5.40 05/07/2025    HGB 15.8 05/07/2025    HCT 46.3 05/07/2025    MCV 86 05/07/2025    MCH 29.3 05/07/2025    MCHC 34.1 05/07/2025     05/07/2025     Lab Results   Component Value Date    GLUCOSE 95 05/07/2025     05/07/2025    K 4.2 05/07/2025     05/07/2025    CO2 28 05/07/2025    ANIONGAP 10 05/07/2025    BUN 25 (H) 05/07/2025    CREATININE 0.99 05/07/2025    CALCIUM 9.1 05/07/2025    ALBUMIN 4.4 05/05/2025    ALKPHOS 58 02/28/2025    PROT 6.7 02/28/2025    AST 23 02/28/2025    BILITOT 0.6 02/28/2025    ALT 26 02/28/2025      Lab Results   Component Value Date    CHOL 225 (H) 02/28/2025    HDL 66 02/28/2025    CHHDL 3.4 02/28/2025    LDLCALC 143 (H) 02/28/2025    VLDL 16 10/21/2024    TRIG 65  "02/28/2025      Lab Results   Component Value Date    TSH 2.52 10/21/2024      No results found for: \"VITD25\"   Lab Results   Component Value Date    HGBA1C 5.7 (H) 02/28/2025    SHRYUYAK3T 117 02/28/2025       Physical Exam  Vitals reviewed.   Constitutional:       General: He is not in acute distress.     Appearance: Normal appearance. He is well-developed.   HENT:      Head: Normocephalic and atraumatic.   Eyes:      General: Lids are normal.      Conjunctiva/sclera:      Right eye: Right conjunctiva is not injected.      Left eye: Left conjunctiva is not injected.   Neck:      Comments: Decreased ROM with neck flexion side bending, rotation due to pain.    Ttp over cervical spine/cervical paraspinals.      Cardiovascular:      Rate and Rhythm: Normal rate and regular rhythm.      Heart sounds: No murmur heard.  Pulmonary:      Effort: Pulmonary effort is normal. No respiratory distress.      Breath sounds: Normal breath sounds. No wheezing, rhonchi or rales.   Musculoskeletal:      Comments: Normal hand  strength bilaterally.     Normal ROM of bilateral shoulders.      Decreased sensation in right arm.    Lymphadenopathy:      Cervical: No cervical adenopathy.   Skin:     General: Skin is warm and dry.      Findings: No rash.   Neurological:      Mental Status: He is alert and oriented to person, place, and time. Mental status is at baseline.   Psychiatric:         Mood and Affect: Mood normal.         Behavior: Behavior normal.         Assessment & Plan  Cervical stenosis of spinal canal  Follow up with pain management  Short term disability paperwork addressed.  Extension of disability recommended for three more weeks due to persistent neck pain and radiculopathy and inability to perform normal work duties.                             [1] No Known Allergies  [2]   Past Surgical History:  Procedure Laterality Date    OTHER SURGICAL HISTORY  09/17/2021    Stomach surgery    OTHER SURGICAL HISTORY  09/17/2021 "    Tonsillectomy    OTHER SURGICAL HISTORY  06/15/2022    Hernia repair    OTHER SURGICAL HISTORY  06/15/2022    Appendectomy   [3]   Social History  Tobacco Use    Smoking status: Never    Smokeless tobacco: Never   Vaping Use    Vaping status: Never Used   Substance Use Topics    Alcohol use: Not Currently     Comment: stopped 2023.    Drug use: Not Currently     Types: Marijuana     Comment: Hx:  THC Gummies. (Quit 2025).   [4]   Family History  Problem Relation Name Age of Onset    Diabetes Father      Breast cancer Paternal Grandmother

## 2025-06-09 ENCOUNTER — TELEPHONE (OUTPATIENT)
Dept: PAIN MEDICINE | Facility: CLINIC | Age: 65
End: 2025-06-09
Payer: COMMERCIAL

## 2025-06-09 DIAGNOSIS — M54.12 CERVICAL RADICULOPATHY: Primary | ICD-10-CM

## 2025-06-09 DIAGNOSIS — M54.12 RADICULOPATHY, CERVICAL: Primary | ICD-10-CM

## 2025-06-09 NOTE — TELEPHONE ENCOUNTER
This patient is calling to say he has not had any relief from his CESB on 5/29.  He  is stating that he is still in pain, cannot go to work and is wondering how soon he can have another injection, and what else can you advise until then for his pain.

## 2025-06-12 ENCOUNTER — HOSPITAL ENCOUNTER (OUTPATIENT)
Dept: PAIN MEDICINE | Facility: CLINIC | Age: 65
Discharge: HOME | End: 2025-06-12
Payer: COMMERCIAL

## 2025-06-12 VITALS
OXYGEN SATURATION: 96 % | RESPIRATION RATE: 16 BRPM | SYSTOLIC BLOOD PRESSURE: 154 MMHG | TEMPERATURE: 96.7 F | HEART RATE: 50 BPM | DIASTOLIC BLOOD PRESSURE: 90 MMHG

## 2025-06-12 DIAGNOSIS — M54.12 RADICULOPATHY, CERVICAL: ICD-10-CM

## 2025-06-12 DIAGNOSIS — M48.02 CERVICAL STENOSIS OF SPINAL CANAL: ICD-10-CM

## 2025-06-12 DIAGNOSIS — M54.12 CERVICAL RADICULOPATHY: ICD-10-CM

## 2025-06-12 PROCEDURE — 2550000001 HC RX 255 CONTRASTS: Performed by: PAIN MEDICINE

## 2025-06-12 PROCEDURE — 62321 NJX INTERLAMINAR CRV/THRC: CPT | Performed by: PAIN MEDICINE

## 2025-06-12 PROCEDURE — 2500000004 HC RX 250 GENERAL PHARMACY W/ HCPCS (ALT 636 FOR OP/ED): Performed by: PAIN MEDICINE

## 2025-06-12 RX ORDER — DICLOFENAC SODIUM 75 MG/1
75 TABLET, DELAYED RELEASE ORAL 2 TIMES DAILY
Qty: 60 TABLET | Refills: 0 | Status: SHIPPED | OUTPATIENT
Start: 2025-06-12

## 2025-06-12 RX ORDER — BUPIVACAINE HYDROCHLORIDE 2.5 MG/ML
INJECTION, SOLUTION EPIDURAL; INFILTRATION; INTRACAUDAL; PERINEURAL AS NEEDED
Status: COMPLETED | OUTPATIENT
Start: 2025-06-12 | End: 2025-06-12

## 2025-06-12 RX ORDER — TRAMADOL HYDROCHLORIDE 50 MG/1
50 TABLET, FILM COATED ORAL 2 TIMES DAILY
Qty: 15 TABLET | Refills: 0 | Status: SHIPPED | OUTPATIENT
Start: 2025-06-12 | End: 2025-06-19

## 2025-06-12 RX ORDER — METHYLPREDNISOLONE ACETATE 80 MG/ML
INJECTION, SUSPENSION INTRA-ARTICULAR; INTRALESIONAL; INTRAMUSCULAR; SOFT TISSUE AS NEEDED
Status: COMPLETED | OUTPATIENT
Start: 2025-06-12 | End: 2025-06-12

## 2025-06-12 RX ORDER — METHYLPREDNISOLONE 4 MG/1
TABLET ORAL
Qty: 21 TABLET | Refills: 0 | Status: SHIPPED | OUTPATIENT
Start: 2025-06-12

## 2025-06-12 RX ORDER — LIDOCAINE HYDROCHLORIDE 10 MG/ML
INJECTION, SOLUTION EPIDURAL; INFILTRATION; INTRACAUDAL; PERINEURAL AS NEEDED
Status: COMPLETED | OUTPATIENT
Start: 2025-06-12 | End: 2025-06-12

## 2025-06-12 RX ADMIN — BUPIVACAINE HYDROCHLORIDE 10 ML: 2.5 INJECTION, SOLUTION EPIDURAL; INFILTRATION; INTRACAUDAL; PERINEURAL at 10:57

## 2025-06-12 RX ADMIN — METHYLPREDNISOLONE ACETATE 80 MG: 80 INJECTION, SUSPENSION INTRA-ARTICULAR; INTRALESIONAL; INTRAMUSCULAR; SOFT TISSUE at 11:01

## 2025-06-12 RX ADMIN — LIDOCAINE HYDROCHLORIDE 10 ML: 10 INJECTION, SOLUTION EPIDURAL; INFILTRATION; INTRACAUDAL; PERINEURAL at 11:01

## 2025-06-12 RX ADMIN — IOHEXOL 10 ML: 300 INJECTION, SOLUTION INTRAVENOUS at 11:01

## 2025-06-12 ASSESSMENT — COLUMBIA-SUICIDE SEVERITY RATING SCALE - C-SSRS
6. HAVE YOU EVER DONE ANYTHING, STARTED TO DO ANYTHING, OR PREPARED TO DO ANYTHING TO END YOUR LIFE?: NO
2. HAVE YOU ACTUALLY HAD ANY THOUGHTS OF KILLING YOURSELF?: NO
1. IN THE PAST MONTH, HAVE YOU WISHED YOU WERE DEAD OR WISHED YOU COULD GO TO SLEEP AND NOT WAKE UP?: NO

## 2025-06-12 ASSESSMENT — PATIENT HEALTH QUESTIONNAIRE - PHQ9
SUM OF ALL RESPONSES TO PHQ9 QUESTIONS 1 AND 2: 0
1. LITTLE INTEREST OR PLEASURE IN DOING THINGS: NOT AT ALL
2. FEELING DOWN, DEPRESSED OR HOPELESS: NOT AT ALL

## 2025-06-12 ASSESSMENT — PAIN - FUNCTIONAL ASSESSMENT
PAIN_FUNCTIONAL_ASSESSMENT: 0-10
PAIN_FUNCTIONAL_ASSESSMENT: 0-10

## 2025-06-12 ASSESSMENT — PAIN SCALES - GENERAL
PAINLEVEL_OUTOF10: 5 - MODERATE PAIN
PAINLEVEL_OUTOF10: 0 - NO PAIN

## 2025-06-12 ASSESSMENT — PAIN DESCRIPTION - DESCRIPTORS: DESCRIPTORS: NUMBNESS;TINGLING;SHOOTING

## 2025-06-12 NOTE — DISCHARGE INSTRUCTIONS
Post-injection instructions:    Your pain may not be gone immediately after the procedure--it usually takes the steroid 3-5 days to start working.   It may take several weeks for the medicine to reach its' full effect.   Pay attention to how much pain relief (what percentage compared to before the procedure) you get and for how long it lasts.     Activity: Avoid strenuous activity for 24 hours. After that return to your normal activity level.     Bandages: Remove after 24 hours     Showering/Bathing: You may shower after bandage is removed     Follow up: CALL OFFICE IN 7 DAYS [phone] LEAVE MESSAGE ABOUT THE RELIEF THAT WAS OBTAINED      Call the doctor immediately: if you notice:     Excessive bleeding from procedure site (brisk bright red bleeding from the site or bleeding that soaks the bandages or does not stop)   Severe headache  Inability to walk, leg or arm weakness or numbness that is worse after the procedure   Uncontrolled pain   New urinary or fecal incontinence   Signs of infection: Fever above 101.5F, redness, swelling, pus or drainage from the site    Epidural Injection    Why is this procedure done?  With an epidural injection, the doctor injects drugs deep into the area around your spinal cord. This is different than epidural anesthesia that is used for surgery or when a woman has a baby. Your spine is a group of bones in your back that protect the nerves in your spinal cord. Problems with your spine can cause swollen nerves in the spinal cord. This swelling leads to pain and can limit movement. In an epidural injection, the doctor may give you a drug to help with swelling and pain.  You may have an epidural injection in different parts of your back, based on where your pain is. For pain in your head or arms, you may have a cervical epidural injection. If your pain is in your upper or middle back, you may get a thoracic epidural injection. For pain in your lower back or legs, you may get a  lumbar epidural injection.    What will the results be?  The treatment may:  Lower pain  Reduce swelling in the nerves  Improve movement  What happens before the procedure?  Your doctor will take your history. Talk to the doctor about:  All the drugs you are taking. Be sure to include all prescription and over-the-counter (OTC) drugs, and herbal supplements. Tell the doctor about any drug allergy. Bring a list of drugs you take with you.  If you have high blood sugar or diabetes. Your drugs may need to be changed.  Any bleeding problems. Be sure to tell your doctor if you are taking any drugs that may cause bleeding. Some of these are warfarin, rivaroxaban, apixaban, ticagrelor, clopidogrel, ketorolac, ibuprofen, naproxen, or aspirin. Certain vitamins and herbs, such as garlic and fish oil, may also add to the risk for bleeding. You may need to stop these drugs as well. Talk to your doctor about them.  Tell the doctor if you are pregnant.  You will not be allowed to drive right away after the procedure. Ask a family member or a friend to drive you home.  What happens during the procedure?  To help the doctor make sure the drugs are being injected in the right place, your doctor may do an x-ray of your spine. Other times your doctor may do a continuous x-ray during the procedure. This is a fluoroscopy. The doctor may also use a colored dye or a contrast dye to check where to inject the drug.  You may be given a drug to help you relax. You may be given a drug to make the area of the injection numb.  The doctor will clean the skin on your back or neck. This helps prevent infection.  The doctor will put a needle through the skin toward your spine. The drug will be injected into a space near the spine.  The needle will be taken out and a bandage will be placed over the injection site.  What happens after the procedure?  Staff will check on you to make sure you are doing well. They will tell you when you can go  home.  What care is needed at home?  Relax on the day of the injection.  Do not drive or run machines for at least 12 hours afterwards.  Apply ice to the injection site. Place an ice pack or a bag of frozen peas wrapped in a towel over the painful part. Never put ice right on the skin. Do not leave the ice on more than 10 to 15 minutes at a time.  It may take a few days before you will feel the effects of the injection.  What follow-up care is needed?  Your doctor may ask you to make visits to the office to check on your progress. Be sure to keep these visits. You may also need to see a physical therapist (PT). The PT will teach you exercises to help you get back your strength and motion. Ask your doctor when you can exercise.  What problems could happen?  Bleeding  Infection (rare)  Headache  Nerve injury  If you have diabetes, your blood sugar can go up after the injection. Check with your doctor if you need more treatment for this.  Last Reviewed Date      Patient was advised to follow-up with the neurosurgery team to discuss surgical intervention if he does not have any significant improvement with the epidural

## 2025-06-19 DIAGNOSIS — N52.9 VASCULOGENIC ERECTILE DYSFUNCTION, UNSPECIFIED VASCULOGENIC ERECTILE DYSFUNCTION TYPE: ICD-10-CM

## 2025-06-19 DIAGNOSIS — M54.12 CERVICAL RADICULOPATHY: ICD-10-CM

## 2025-06-19 DIAGNOSIS — N40.1 BPH WITH LOWER URINARY TRACT SYMPTOMS WITHOUT URINARY OBSTRUCTION: ICD-10-CM

## 2025-06-19 DIAGNOSIS — M48.02 CERVICAL STENOSIS OF SPINAL CANAL: ICD-10-CM

## 2025-06-19 RX ORDER — GABAPENTIN 300 MG/1
600 CAPSULE ORAL EVERY 8 HOURS SCHEDULED
Qty: 180 CAPSULE | Refills: 0 | OUTPATIENT
Start: 2025-06-19 | End: 2025-07-19

## 2025-06-19 RX ORDER — TRAMADOL HYDROCHLORIDE 50 MG/1
50 TABLET, FILM COATED ORAL 2 TIMES DAILY
Qty: 15 TABLET | Refills: 0 | OUTPATIENT
Start: 2025-06-19 | End: 2025-06-26

## 2025-06-19 RX ORDER — TADALAFIL 5 MG/1
5 TABLET ORAL DAILY
Qty: 90 TABLET | Refills: 3 | Status: SHIPPED | OUTPATIENT
Start: 2025-06-19 | End: 2026-06-19

## 2025-06-19 NOTE — TELEPHONE ENCOUNTER
Cervical epidural,  hit a nerve, now he has numbness lots of pain.  The other DrAnastasia Gave him Tramadol, which is helping.  The other DrAnastasia Said he will not give him anymore shots, he recommends surgery.  The patient is calling this a set back and the DrAnastasia Is calling it discomfort.  He doesn't want surgery, he was thinking about trying a chiropractor.  He said his work excuse is until 6/27/25, he doesn't think he will be able to go back to work.

## 2025-06-20 ENCOUNTER — TELEPHONE (OUTPATIENT)
Dept: PAIN MEDICINE | Facility: CLINIC | Age: 65
End: 2025-06-20
Payer: COMMERCIAL

## 2025-06-24 ENCOUNTER — APPOINTMENT (OUTPATIENT)
Facility: CLINIC | Age: 65
End: 2025-06-24
Payer: COMMERCIAL

## 2025-06-24 VITALS
SYSTOLIC BLOOD PRESSURE: 124 MMHG | OXYGEN SATURATION: 96 % | HEART RATE: 68 BPM | HEIGHT: 64 IN | BODY MASS INDEX: 25.66 KG/M2 | DIASTOLIC BLOOD PRESSURE: 76 MMHG | WEIGHT: 150.3 LBS | TEMPERATURE: 97.8 F | RESPIRATION RATE: 18 BRPM

## 2025-06-24 DIAGNOSIS — M48.02 CERVICAL STENOSIS OF SPINAL CANAL: Primary | ICD-10-CM

## 2025-06-24 DIAGNOSIS — E78.5 HYPERLIPIDEMIA, UNSPECIFIED HYPERLIPIDEMIA TYPE: ICD-10-CM

## 2025-06-24 DIAGNOSIS — I10 HYPERTENSION, ESSENTIAL, BENIGN: ICD-10-CM

## 2025-06-24 DIAGNOSIS — M54.12 CERVICAL RADICULOPATHY: ICD-10-CM

## 2025-06-24 PROCEDURE — 3008F BODY MASS INDEX DOCD: CPT | Performed by: FAMILY MEDICINE

## 2025-06-24 PROCEDURE — 99214 OFFICE O/P EST MOD 30 MIN: CPT | Performed by: FAMILY MEDICINE

## 2025-06-24 PROCEDURE — 3074F SYST BP LT 130 MM HG: CPT | Performed by: FAMILY MEDICINE

## 2025-06-24 PROCEDURE — 3078F DIAST BP <80 MM HG: CPT | Performed by: FAMILY MEDICINE

## 2025-06-24 RX ORDER — PREDNISONE 20 MG/1
40 TABLET ORAL DAILY
Qty: 10 TABLET | Refills: 0 | Status: SHIPPED | OUTPATIENT
Start: 2025-06-24 | End: 2025-06-29

## 2025-06-24 RX ORDER — GABAPENTIN 300 MG/1
600 CAPSULE ORAL EVERY 8 HOURS SCHEDULED
Qty: 180 CAPSULE | Refills: 0 | Status: SHIPPED | OUTPATIENT
Start: 2025-06-24

## 2025-06-24 ASSESSMENT — ENCOUNTER SYMPTOMS
SHORTNESS OF BREATH: 0
HYPERTENSION: 1
HEADACHES: 0

## 2025-06-24 NOTE — ASSESSMENT & PLAN NOTE
Orders:    gabapentin (Neurontin) 300 mg capsule; Take 2 capsules (600 mg) by mouth every 8 hours.    predniSONE (Deltasone) 20 mg tablet; Take 2 tablets (40 mg) by mouth once daily for 5 days.

## 2025-06-24 NOTE — PROGRESS NOTES
"Dmitri Dotson \"Kemar\" is a 64 y.o. male who presents for Hypertension (Pt also stated he might have a possible surgery on his neck. Pt stated he has been out of work since May. Pt has been in therapy and is still experiencing numbness and tingling ).    Hypertension  Associated symptoms include anxiety. Pertinent negatives include no chest pain, headaches or shortness of breath.      Patient is here today for follow up on his neck pain/cervical stenosis/cervical radiculopathy.  He is on short term leave from work.  He  is still unable to work at all at this time due to his neck pain.  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  He has short term disability paperwork today that needs to be updated.  He does not feel physically ready to go back to work.  He is still unable to lift anything heavy and still has significant shooting pain, numbness and tingling in his right upper extremity.   He is requesting 30 days off but hopes to go back to work before that timeframe.      He did see  pain management, Dr. Florez.  He reports his second cervical epidural steroid injection (received on 6/12/25) caused some increased discomfort of right medial forearm (tingling/numbness/pain).  His numbness and tingling has improved some but the burning type pain is still present.  He reports his pain specialist recommended consultation with neurosurgery.  Pt has appointment with  neurosurgery on 7/9/25 to discuss surgical options for his cervical stenosis/radiculopathy.   His neck pain has improved overall.  He is more concerned with his right arm/forearm shooting type pain and tinging.      He is taking 325 mg asa as needed.    He is not taking acetaminophen currently.     Pt is requesting refill of gabapentin which helps with his nerve pain.   He also has been taking tramadol as needed which has helped.       Review of Systems   Respiratory:  Negative for shortness of breath.    Cardiovascular:  Negative for " "chest pain.   Neurological:  Negative for headaches.       Objective     Vitals:    06/24/25 0931   BP: 124/76   Pulse: 68   Resp: 18   Temp: 36.6 °C (97.8 °F)   TempSrc: Tympanic   SpO2: 96%   Weight: 68.2 kg (150 lb 4.8 oz)   Height: 1.626 m (5' 4\")        Current Outpatient Medications   Medication Instructions    acetaminophen (TYLENOL) 975 mg, oral, Every 8 hours    blood pressure monitor (Blood Pressure Kit) kit 1 kit, miscellaneous, Daily PRN    diclofenac (VOLTAREN) 75 mg, oral, 2 times daily, Do not crush, chew, or split.    gabapentin (NEURONTIN) 600 mg, oral, Every 8 hours scheduled    lidocaine 4 % patch 1 patch, transdermal, Daily, Remove & discard patch within 12 hours or as directed by MD.    losartan (COZAAR) 100 mg, oral, Daily    pantoprazole (PROTONIX) 40 mg, oral, Daily before breakfast, Do not crush, chew, or split.    predniSONE (DELTASONE) 40 mg, oral, Daily    rosuvastatin (CRESTOR) 5 mg, oral, Daily    tadalafil (CIALIS) 5 mg, oral, Daily        RX Allergies[1]     Surgical History[2]     Social History[3]     Family History[4]     Immunization History   Administered Date(s) Administered    COVID-19, mRNA, LNP-S, PF, 30 mcg/0.3 mL dose 05/04/2021, 05/24/2021, 12/11/2021    Flu vaccine, trivalent, preservative free, age 6 months and greater (Fluarix/Fluzone/Flulaval) 10/18/2024    Pfizer COVID-19 vaccine, bivalent, age 12 years and older (30 mcg/0.3 mL) 10/22/2022    Td vaccine, age 7 years and older (TDVAX) 01/01/2000    Tdap vaccine, age 7 year and older (BOOSTRIX, ADACEL) 04/03/2015, 09/17/2021    Zoster vaccine, recombinant, adult (SHINGRIX) 09/17/2021, 04/15/2025        Lab Results   Component Value Date    WBC 7.8 05/07/2025    RBC 5.40 05/07/2025    HGB 15.8 05/07/2025    HCT 46.3 05/07/2025    MCV 86 05/07/2025    MCH 29.3 05/07/2025    MCHC 34.1 05/07/2025     05/07/2025     Lab Results   Component Value Date    GLUCOSE 95 05/07/2025     05/07/2025    K 4.2 05/07/2025    " " 05/07/2025    CO2 28 05/07/2025    ANIONGAP 10 05/07/2025    BUN 25 (H) 05/07/2025    CREATININE 0.99 05/07/2025    CALCIUM 9.1 05/07/2025    ALBUMIN 4.4 05/05/2025    ALKPHOS 58 02/28/2025    PROT 6.7 02/28/2025    AST 23 02/28/2025    BILITOT 0.6 02/28/2025    ALT 26 02/28/2025      Lab Results   Component Value Date    CHOL 225 (H) 02/28/2025    HDL 66 02/28/2025    CHHDL 3.4 02/28/2025    LDLCALC 143 (H) 02/28/2025    VLDL 16 10/21/2024    TRIG 65 02/28/2025      Lab Results   Component Value Date    TSH 2.52 10/21/2024      No results found for: \"VITD25\"   Lab Results   Component Value Date    HGBA1C 5.7 (H) 02/28/2025    [id number] 117 02/28/2025       Physical Exam  Vitals reviewed.   Constitutional:       General: He is not in acute distress.     Appearance: Normal appearance. He is well-developed.   HENT:      Head: Normocephalic and atraumatic.   Eyes:      General: Lids are normal.      Conjunctiva/sclera:      Right eye: Right conjunctiva is not injected.      Left eye: Left conjunctiva is not injected.   Neck:      Comments: Decreased ROM with neck flexion side bending, rotation due to pain.    Ttp over cervical spine/cervical paraspinals.      Cardiovascular:      Rate and Rhythm: Normal rate and regular rhythm.      Heart sounds: No murmur heard.  Pulmonary:      Effort: Pulmonary effort is normal. No respiratory distress.      Breath sounds: Normal breath sounds. No wheezing, rhonchi or rales.   Musculoskeletal:      Comments: Normal hand  strength bilaterally.     Normal ROM of bilateral shoulders.      Decreased sensation in right arm.     Neck ROM has improved since last visit - he has better neck rotation and sidebending.      No c spine ttp.     Skin:     General: Skin is warm and dry.      Findings: No rash.   Neurological:      Mental Status: He is alert and oriented to person, place, and time. Mental status is at baseline.   Psychiatric:         Mood and Affect: Mood normal.        "  Behavior: Behavior normal.         Assessment & Plan  Cervical stenosis of spinal canal    Orders:    gabapentin (Neurontin) 300 mg capsule; Take 2 capsules (600 mg) by mouth every 8 hours.    predniSONE (Deltasone) 20 mg tablet; Take 2 tablets (40 mg) by mouth once daily for 5 days.    Cervical radiculopathy    Orders:    gabapentin (Neurontin) 300 mg capsule; Take 2 capsules (600 mg) by mouth every 8 hours.    predniSONE (Deltasone) 20 mg tablet; Take 2 tablets (40 mg) by mouth once daily for 5 days.    Hyperlipidemia, unspecified hyperlipidemia type    Orders:    Follow Up In Primary Care - Established    Hypertension, essential, benign  Controlled   Orders:    Follow Up In Primary Care - Established                        [1] No Known Allergies  [2]   Past Surgical History:  Procedure Laterality Date    OTHER SURGICAL HISTORY  09/17/2021    Stomach surgery    OTHER SURGICAL HISTORY  09/17/2021    Tonsillectomy    OTHER SURGICAL HISTORY  06/15/2022    Hernia repair    OTHER SURGICAL HISTORY  06/15/2022    Appendectomy   [3]   Social History  Tobacco Use    Smoking status: Never    Smokeless tobacco: Never   Vaping Use    Vaping status: Never Used   Substance Use Topics    Alcohol use: Not Currently     Comment: stopped 2023.    Drug use: Not Currently     Types: Marijuana     Comment: Hx:  THC Gummies. (Quit 2025).   [4]   Family History  Problem Relation Name Age of Onset    Diabetes Father      Breast cancer Paternal Grandmother

## 2025-07-07 ENCOUNTER — APPOINTMENT (OUTPATIENT)
Dept: UROLOGY | Facility: CLINIC | Age: 65
End: 2025-07-07
Payer: COMMERCIAL

## 2025-07-08 ENCOUNTER — HOSPITAL ENCOUNTER (OUTPATIENT)
Dept: NEUROLOGY | Facility: HOSPITAL | Age: 65
Discharge: HOME | End: 2025-07-08
Payer: COMMERCIAL

## 2025-07-08 DIAGNOSIS — M54.12 CERVICAL RADICULOPATHY: ICD-10-CM

## 2025-07-08 PROCEDURE — 95886 MUSC TEST DONE W/N TEST COMP: CPT | Performed by: PSYCHIATRY & NEUROLOGY

## 2025-07-08 PROCEDURE — 95911 NRV CNDJ TEST 9-10 STUDIES: CPT | Performed by: PSYCHIATRY & NEUROLOGY

## 2025-07-08 NOTE — ADDENDUM NOTE
Encounter addended by: Quin Olsen, RT on: 7/8/2025 3:19 PM   Actions taken: Imaging Exam ended, Check Out activity completed

## 2025-07-09 ENCOUNTER — HOSPITAL ENCOUNTER (OUTPATIENT)
Dept: RADIOLOGY | Facility: HOSPITAL | Age: 65
Discharge: HOME | End: 2025-07-09
Payer: COMMERCIAL

## 2025-07-09 ENCOUNTER — APPOINTMENT (OUTPATIENT)
Dept: NEUROSURGERY | Facility: CLINIC | Age: 65
End: 2025-07-09
Payer: COMMERCIAL

## 2025-07-09 VITALS
WEIGHT: 153.7 LBS | TEMPERATURE: 98.1 F | BODY MASS INDEX: 26.24 KG/M2 | RESPIRATION RATE: 14 BRPM | HEART RATE: 72 BPM | HEIGHT: 64 IN | SYSTOLIC BLOOD PRESSURE: 132 MMHG | DIASTOLIC BLOOD PRESSURE: 80 MMHG

## 2025-07-09 DIAGNOSIS — M54.12 CERVICAL RADICULOPATHY: ICD-10-CM

## 2025-07-09 DIAGNOSIS — M54.12 CERVICAL RADICULOPATHY: Primary | ICD-10-CM

## 2025-07-09 PROCEDURE — 72040 X-RAY EXAM NECK SPINE 2-3 VW: CPT | Performed by: RADIOLOGY

## 2025-07-09 PROCEDURE — 3075F SYST BP GE 130 - 139MM HG: CPT | Performed by: PHYSICIAN ASSISTANT

## 2025-07-09 PROCEDURE — 72040 X-RAY EXAM NECK SPINE 2-3 VW: CPT

## 2025-07-09 PROCEDURE — 1036F TOBACCO NON-USER: CPT | Performed by: PHYSICIAN ASSISTANT

## 2025-07-09 PROCEDURE — 3079F DIAST BP 80-89 MM HG: CPT | Performed by: PHYSICIAN ASSISTANT

## 2025-07-09 PROCEDURE — 3008F BODY MASS INDEX DOCD: CPT | Performed by: PHYSICIAN ASSISTANT

## 2025-07-09 PROCEDURE — 99214 OFFICE O/P EST MOD 30 MIN: CPT | Performed by: PHYSICIAN ASSISTANT

## 2025-07-09 ASSESSMENT — PATIENT HEALTH QUESTIONNAIRE - PHQ9
1. LITTLE INTEREST OR PLEASURE IN DOING THINGS: MORE THAN HALF THE DAYS
9. THOUGHTS THAT YOU WOULD BE BETTER OFF DEAD, OR OF HURTING YOURSELF: NOT AT ALL
5. POOR APPETITE OR OVEREATING: SEVERAL DAYS
3. TROUBLE FALLING OR STAYING ASLEEP OR SLEEPING TOO MUCH: NEARLY EVERY DAY
2. FEELING DOWN, DEPRESSED OR HOPELESS: SEVERAL DAYS
4. FEELING TIRED OR HAVING LITTLE ENERGY: SEVERAL DAYS
6. FEELING BAD ABOUT YOURSELF - OR THAT YOU ARE A FAILURE OR HAVE LET YOURSELF OR YOUR FAMILY DOWN: SEVERAL DAYS
7. TROUBLE CONCENTRATING ON THINGS, SUCH AS READING THE NEWSPAPER OR WATCHING TELEVISION: SEVERAL DAYS
SUM OF ALL RESPONSES TO PHQ QUESTIONS 1-9: 12
SUM OF ALL RESPONSES TO PHQ9 QUESTIONS 1 AND 2: 3
8. MOVING OR SPEAKING SO SLOWLY THAT OTHER PEOPLE COULD HAVE NOTICED. OR THE OPPOSITE, BEING SO FIGETY OR RESTLESS THAT YOU HAVE BEEN MOVING AROUND A LOT MORE THAN USUAL: MORE THAN HALF THE DAYS

## 2025-07-09 ASSESSMENT — PAIN SCALES - GENERAL: PAINLEVEL_OUTOF10: 2

## 2025-07-09 NOTE — PROGRESS NOTES
[facility]  Department of Neurological Surgery  Established Patient Visit    History of Present Illness  Jericho Dotson is a 64 y.o. year old male who presents to the spine clinic in follow up with neck pain and radiculopathy symptoms.  Patient initially seen in emergency department at [facility] May 5 due to the symptoms.  Continues with radiating pain, numbness, and weakness.  Has MRI identifying loss of cervical lordosis as well as foraminal greater than central stenosis.  Patient progressed through physical therapy, medication trials of diclofenac, gabapentin, and Tylenol, and pain management epidural steroid injections with only mild improvement of radicular symptoms.  Repeat epidural steroid injection with acute worsening of symptoms during procedure and no further injections to be trialed.  Patient denies any further spread or worsening of symptoms versus prior consult note.      Patient's BMI is Body mass index is 26.38 kg/m².    14/14 systems reviewed and negative other than what is listed in the history of present illness    Problem List[1]  Medical History[2]  Surgical History[3]  Social History     Tobacco Use    Smoking status: Never    Smokeless tobacco: Never   Substance Use Topics    Alcohol use: Not Currently     Comment: stopped 2023.     family history includes Breast cancer in his paternal grandmother; Diabetes in his father.  Current Medications[4]  Allergies[5]    Physical Examination:    General: Well developed, awake/alert/oriented x3, no distress, alert and cooperative  Skin: Warm and dry, no lesions, no rashes  ENMT: Mucous membranes moist, no apparent injury, no lesions seen  Head/Neck: Neck Supple, no apparent injury  Respiratory/Thorax: Normal breath sounds with good chest expansion, thorax symmetric  Cardiovascular: No pitting edema, no JVD    Motor Strength: Slight deficit right upper extremity versus left, otherwise 5/5 Throughout all  extremities     Muscle Bulk: Normal and symmetric in all extremities    Posture:   -- Cervical: Normal  -- Thoracic: Normal  -- Lumbar : Normal  Paraspinal muscle spasm/tenderness cervical spine  Midline tenderness absent    Sensation: intact to light touch       Results:  I personally reviewed and interpreted the imaging results which included MRI cervical spine showing right greater left foraminal stenosis C5-6, C6-7    Assessment and Plan:    Jericho Dotson is a 64 y.o. year old male who presents to the spine clinic in follow up with neck pain and radiculopathy symptoms.  Patient initially seen in emergency department at [facility] May 5 due to the symptoms.  Continues with radiating pain, numbness, and weakness.  Has MRI identifying loss of cervical lordosis as well as foraminal greater than central stenosis.  Patient progressed through physical therapy, medication trials of diclofenac, gabapentin, and Tylenol, and pain management epidural steroid injections with only mild improvement of radicular symptoms.  Repeat epidural steroid injection with acute worsening of symptoms during procedure and no further injections to be trialed.  Patient denies any further spread or worsening of symptoms versus prior consult note.  Conservative treatments exhausted without any significant or lasting improvement of radicular symptoms.  Will have patient follow-up with attending neurosurgeon for next visit.  Flexion-extension x-rays ordered.      34 minutes spend in review, evaluation, discussion and documentation of care.     I have reviewed all prior documentation and reviewed the electronic medical record since admission. I have personally have reviewed all advanced imaging not just the reports and used my interpretation as documented as the relevant findings. I have reviewed the risks and benefits of all treatment recommendations listed in this note with the patient and family.       The above clinical  summary has been dictated with voice recognition software. It has not been proofread for grammatical errors, typographical mistakes, or other semantic inconsistencies.    Thank you for visiting our office today. It was our pleasure to take part in your healthcare.     Do not hesitate to call with any questions regarding your plan of care after leaving at [phone] M-F 8am-4pm.     To clinicians, thank you very much for this kind referral. It is a privilege to partner with you in the care of your patients. My office would be delighted to assist you with any further consultations or with questions regarding the plan of care outlined. Do not hesitate to call the office or contact me directly.       Sincerely,      JOSE L Mancera, PA-C  Associate Physician Assistant, Neurosurgery  Clinical   [facility] [employer] [facility]    [place] [address] [address] [place] [address]    Phone: [phone]  Fax: [phone]                   [1]   Patient Active Problem List  Diagnosis    Hypertension, essential, benign    Hyperlipidemia    Chest pain, atypical    Arthritis of hand, left    Ectatic thoracic aorta    At increased risk for cardiovascular disease    Osteoarthritis    Cervical radiculopathy    Palpitations    Snores    Fatigue    Status post appendectomy    Abnormal EKG    Bradycardia    Gastroesophageal reflux disease without esophagitis    Cervical stenosis of spinal canal    Hyperlipidemia, mixed    Essential hypertension   [2]   Past Medical History:  Diagnosis Date    Hypertension     Numbness 5-2-25   [3]   Past Surgical History:  Procedure Laterality Date    OTHER SURGICAL HISTORY  09/17/2021    Stomach surgery    OTHER SURGICAL HISTORY  09/17/2021    Tonsillectomy    OTHER SURGICAL HISTORY  06/15/2022    Hernia repair    OTHER SURGICAL HISTORY  06/15/2022    Appendectomy   [4]   Current Outpatient  Medications:     acetaminophen (Tylenol) 325 mg tablet, Take 3 tablets (975 mg) by mouth every 8 hours., Disp: , Rfl:     blood pressure monitor (Blood Pressure Kit) kit, 1 kit once daily as needed (check daily at different times of day)., Disp: 1 kit, Rfl: 0    diclofenac (Voltaren) 75 mg EC tablet, Take 1 tablet (75 mg) by mouth 2 times a day. Do not crush, chew, or split., Disp: 60 tablet, Rfl: 0    gabapentin (Neurontin) 300 mg capsule, Take 2 capsules (600 mg) by mouth every 8 hours., Disp: 180 capsule, Rfl: 0    lidocaine 4 % patch, Place 1 patch over 12 hours on the skin once daily. Remove & discard patch within 12 hours or as directed by MD., Disp: 30 patch, Rfl: 0    losartan (Cozaar) 100 mg tablet, Take 1 tablet (100 mg) by mouth once daily., Disp: 90 tablet, Rfl: 3    pantoprazole (ProtoNix) 40 mg EC tablet, Take 1 tablet (40 mg) by mouth once daily in the morning. Take before meals. Do not crush, chew, or split., Disp: 30 tablet, Rfl: 0    rosuvastatin (Crestor) 5 mg tablet, Take 1 tablet (5 mg) by mouth once daily., Disp: 90 tablet, Rfl: 3    tadalafil (Cialis) 5 mg tablet, Take 1 tablet (5 mg) by mouth once daily., Disp: 90 tablet, Rfl: 3  [5] No Known Allergies

## 2025-07-10 ENCOUNTER — APPOINTMENT (OUTPATIENT)
Dept: RADIOLOGY | Facility: CLINIC | Age: 65
End: 2025-07-10
Payer: COMMERCIAL

## 2025-07-15 ENCOUNTER — APPOINTMENT (OUTPATIENT)
Facility: CLINIC | Age: 65
End: 2025-07-15
Payer: COMMERCIAL

## 2025-07-24 ENCOUNTER — TELEPHONE (OUTPATIENT)
Facility: CLINIC | Age: 65
End: 2025-07-24
Payer: COMMERCIAL

## 2025-07-24 NOTE — TELEPHONE ENCOUNTER
Patient is returning to work today and needs a note that says he is allowed to return today with NO RESTRICTIONS.  He will come  the note today before work.  Please call patient when done.

## 2025-08-04 DIAGNOSIS — M48.02 CERVICAL STENOSIS OF SPINAL CANAL: ICD-10-CM

## 2025-08-04 DIAGNOSIS — M54.12 CERVICAL RADICULOPATHY: ICD-10-CM

## 2025-08-04 RX ORDER — GABAPENTIN 300 MG/1
600 CAPSULE ORAL EVERY 8 HOURS SCHEDULED
Qty: 180 CAPSULE | Refills: 0 | Status: SHIPPED | OUTPATIENT
Start: 2025-08-04

## 2025-08-04 NOTE — TELEPHONE ENCOUNTER
Medication:   gabapentin (Neurontin)    Strength:   300 mg capsule     Frequency:   Take 2 capsules (600 mg) by mouth every 8 hours.    Pharmacy:   [facility]/pharmacy [id number] - [place], [place] - [address] AT [facility]  [address] [address] [facility] [address]  Phone: [phone]  Fax: [phone]

## 2025-08-07 ENCOUNTER — APPOINTMENT (OUTPATIENT)
Facility: CLINIC | Age: 65
End: 2025-08-07
Payer: COMMERCIAL

## 2025-08-07 VITALS
TEMPERATURE: 97.8 F | OXYGEN SATURATION: 96 % | DIASTOLIC BLOOD PRESSURE: 82 MMHG | WEIGHT: 156.6 LBS | SYSTOLIC BLOOD PRESSURE: 136 MMHG | HEART RATE: 67 BPM | RESPIRATION RATE: 18 BRPM | BODY MASS INDEX: 26.09 KG/M2 | HEIGHT: 65 IN

## 2025-08-07 DIAGNOSIS — M54.12 CERVICAL RADICULOPATHY: ICD-10-CM

## 2025-08-07 DIAGNOSIS — M48.02 CERVICAL STENOSIS OF SPINAL CANAL: Primary | ICD-10-CM

## 2025-08-07 DIAGNOSIS — K21.9 GASTROESOPHAGEAL REFLUX DISEASE WITHOUT ESOPHAGITIS: ICD-10-CM

## 2025-08-07 PROCEDURE — 3008F BODY MASS INDEX DOCD: CPT | Performed by: FAMILY MEDICINE

## 2025-08-07 PROCEDURE — 1036F TOBACCO NON-USER: CPT | Performed by: FAMILY MEDICINE

## 2025-08-07 PROCEDURE — 3075F SYST BP GE 130 - 139MM HG: CPT | Performed by: FAMILY MEDICINE

## 2025-08-07 PROCEDURE — 99214 OFFICE O/P EST MOD 30 MIN: CPT | Performed by: FAMILY MEDICINE

## 2025-08-07 PROCEDURE — 3079F DIAST BP 80-89 MM HG: CPT | Performed by: FAMILY MEDICINE

## 2025-08-07 NOTE — PROGRESS NOTES
"Subjective     Jericho Dotson \"Kemar\" is a 64 y.o. male who presents for Discuss surgery.    HPI     Patient is here to follow up on his neck pain/cervical stenosis/cervical radiculopathy (right UE).  He had recent evaluation with  neurosurgery PA, Napoleon Ritter, on 7/9/25 and will be seeing Dr. Shiv Miller to discuss surgical options.  He is considering surgery for next summer unless his symptoms worsen.  He is back to work full time, no restrictions.  He went back to work on 7/24/25 and is overall doing okay.  He still has continues to have constant tingling and weakness in right arm.  He quit physical therapy due to work being too demanding.  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  He is able to do his job duties currently.  .  He currently takes gabapentin 300 mg two capsules TID however he sometimes forgets to take the medication on occasion and doesn't notice a huge difference.  He wants to taper off of gabapentin.  He also takes diclofenac 75 mg BID but sometimes forgets to take the diclofenac as well.      He does take protonix for GERD but only takes as needed (once a week - if he eats spicy food).  He feels his symptoms are controlled.          Objective     Vitals:    08/07/25 1032   BP: 144/83   BP Location: Right arm   Patient Position: Sitting   BP Cuff Size: Small adult   Pulse: 67   Resp: 18   Temp: 36.6 °C (97.8 °F)   TempSrc: Temporal   SpO2: 96%   Weight: 71 kg (156 lb 9.6 oz)   Height: 1.646 m (5' 4.8\")        Current Outpatient Medications   Medication Instructions    acetaminophen (TYLENOL) 975 mg, oral, Every 8 hours    blood pressure monitor (Blood Pressure Kit) kit 1 kit, miscellaneous, Daily PRN    diclofenac (VOLTAREN) 75 mg, oral, 2 times daily, Do not crush, chew, or split.    gabapentin (NEURONTIN) 600 mg, oral, Every 8 hours scheduled    lidocaine 4 % patch 1 patch, transdermal, Daily, Remove & " "discard patch within 12 hours or as directed by MD.    losartan (COZAAR) 100 mg, oral, Daily    pantoprazole (PROTONIX) 40 mg, oral, Daily before breakfast, Do not crush, chew, or split.    rosuvastatin (CRESTOR) 5 mg, oral, Daily    tadalafil (CIALIS) 5 mg, oral, Daily        RX Allergies[1]     Surgical History[2]     Social History[3]     Family History[4]     Immunization History   Administered Date(s) Administered    COVID-19, mRNA, LNP-S, PF, 30 mcg/0.3 mL dose 05/04/2021, 05/24/2021, 12/11/2021    Flu vaccine, trivalent, preservative free, age 6 months and greater (Fluarix/Fluzone/Flulaval) 10/18/2024    Pfizer COVID-19 vaccine, bivalent, age 12 years and older (30 mcg/0.3 mL) 10/22/2022    Td vaccine, age 7 years and older (TDVAX) 01/01/2000    Tdap vaccine, age 7 year and older (BOOSTRIX, ADACEL) 04/03/2015, 09/17/2021    Zoster vaccine, recombinant, adult (SHINGRIX) 09/17/2021, 04/15/2025        Lab Results   Component Value Date    WBC 7.8 05/07/2025    RBC 5.40 05/07/2025    HGB 15.8 05/07/2025    HCT 46.3 05/07/2025    MCV 86 05/07/2025    MCH 29.3 05/07/2025    MCHC 34.1 05/07/2025     05/07/2025     Lab Results   Component Value Date    GLUCOSE 95 05/07/2025     05/07/2025    K 4.2 05/07/2025     05/07/2025    CO2 28 05/07/2025    ANIONGAP 10 05/07/2025    BUN 25 (H) 05/07/2025    CREATININE 0.99 05/07/2025    CALCIUM 9.1 05/07/2025    ALBUMIN 4.4 05/05/2025    ALKPHOS 58 02/28/2025    PROT 6.7 02/28/2025    AST 23 02/28/2025    BILITOT 0.6 02/28/2025    ALT 26 02/28/2025      Lab Results   Component Value Date    CHOL 225 (H) 02/28/2025    HDL 66 02/28/2025    CHHDL 3.4 02/28/2025    LDLCALC 143 (H) 02/28/2025    VLDL 16 10/21/2024    TRIG 65 02/28/2025      Lab Results   Component Value Date    TSH 2.52 10/21/2024      No results found for: \"VITD25\"   Lab Results   Component Value Date    HGBA1C 5.7 (H) 02/28/2025    [id number] 117 02/28/2025       Physical Exam  Vitals reviewed. "   Constitutional:       General: He is not in acute distress.     Appearance: Normal appearance. He is well-developed.   HENT:      Head: Normocephalic and atraumatic.     Eyes:      General: Lids are normal.      Conjunctiva/sclera:      Right eye: Right conjunctiva is not injected.      Left eye: Left conjunctiva is not injected.       Cardiovascular:      Rate and Rhythm: Normal rate and regular rhythm.      Heart sounds: No murmur heard.  Pulmonary:      Effort: Pulmonary effort is normal. No respiratory distress.      Breath sounds: Normal breath sounds. No wheezing, rhonchi or rales.     Skin:     General: Skin is warm and dry.      Findings: No rash.     Neurological:      Mental Status: He is alert and oriented to person, place, and time. Mental status is at baseline.     Psychiatric:         Mood and Affect: Mood normal.         Behavior: Behavior normal.         Assessment & Plan  Cervical stenosis of spinal canal  Cary doesn't feel the gagapentin is helpful so he wants to taper off of it.  He will go down to 300 mg 1 capsule TID x 2weeks, then try BID x 2 weeks then at bedtime x 2 weeks, then quit        Cervical radiculopathy  Sees  neurosurgery, will see Dr. Shiv Miller in a few weeks to discuss surgical options sicne he is still having right UE weakness, numbness, tingling.        Gastroesophageal reflux disease without esophagitis  Controlled on PPI as needed                            [1] No Known Allergies  [2]   Past Surgical History:  Procedure Laterality Date    OTHER SURGICAL HISTORY  09/17/2021    Stomach surgery    OTHER SURGICAL HISTORY  09/17/2021    Tonsillectomy    OTHER SURGICAL HISTORY  06/15/2022    Hernia repair    OTHER SURGICAL HISTORY  06/15/2022    Appendectomy   [3]   Social History  Tobacco Use    Smoking status: Never    Smokeless tobacco: Never   Vaping Use    Vaping status: Never Used   Substance Use Topics    Alcohol use: Not Currently     Comment: stopped 2023.    Drug  use: Not Currently     Types: Marijuana     Comment: Hx:  THC Gummies. (Quit 2025).   [4]   Family History  Problem Relation Name Age of Onset    Diabetes Father      Breast cancer Paternal Grandmother

## 2025-08-07 NOTE — ASSESSMENT & PLAN NOTE
Cary doesn't feel the gagapentin is helpful so he wants to taper off of it.  He will go down to 300 mg 1 capsule TID x 2weeks, then try BID x 2 weeks then at bedtime x 2 weeks, then quit        
Controlled on PPI as needed        
Sees  neurosurgery, will see Dr. Shiv Miller in a few weeks to discuss surgical options sicne he is still having right UE weakness, numbness, tingling.        
Patient/Caregiver provided printed discharge information.

## 2025-11-11 ENCOUNTER — APPOINTMENT (OUTPATIENT)
Facility: CLINIC | Age: 65
End: 2025-11-11
Payer: COMMERCIAL

## 2025-11-21 ENCOUNTER — APPOINTMENT (OUTPATIENT)
Dept: UROLOGY | Facility: CLINIC | Age: 65
End: 2025-11-21
Payer: COMMERCIAL

## 2026-04-10 ENCOUNTER — APPOINTMENT (OUTPATIENT)
Dept: DERMATOLOGY | Facility: CLINIC | Age: 66
End: 2026-04-10
Payer: COMMERCIAL